# Patient Record
Sex: FEMALE | Race: WHITE | NOT HISPANIC OR LATINO | Employment: FULL TIME | URBAN - METROPOLITAN AREA
[De-identification: names, ages, dates, MRNs, and addresses within clinical notes are randomized per-mention and may not be internally consistent; named-entity substitution may affect disease eponyms.]

---

## 2018-01-11 NOTE — PROGRESS NOTES
MAR 1 2016         RE: Solitario Poe                                To: Jillian BEAU Delgadillo    MR#: [id number]   : 1985   ENC: [id number]                             Fax: [phone]   (Exam #: [id number])      The LMP of this 31year old,  2, para 1 patient was AUG 14 2015,   giving her an ULISSES of MAY 20 2016 and a current gestational age of 35 weeks   4 days by dates  A sonographic examination was performed on MAR 1 2016   using real time equipment  The ultrasound examination was performed using   abdominal & vaginal techniques  The patient has a BMI of 30 0  Her blood   pressure today was 103/67  Earliest ultrasound found in her record: 10/15/15   8w1d  16 ULISSES       Thank you very much for referring this very nice patient for a    consultation and fetal anatomic survey  This is the patient's second   pregnancy and it has been complicated I a placenta previa with concerns   for possible vasa previa  The patient has a history of a prior full-term    section in  without complications  She has a history of   asthma and genital herpes  She denies current use of tobacco, alcohol, or   drugs  Her medications include prenatal vitamins and she has an allergy   to Advair  Her family medical history is otherwise unremarkable  A   review of systems is otherwise negative  On exam, the patient appears   well, in no acute distress, and her abdomen is nontender              Cardiac motion was observed at 144 bpm       INDICATIONS      fetal anatomical survey   low lying placenta   vasa previa   declines genetic screening   long  interval pregnancy   obesity      Exam Types      LEVEL II   Transvaginal      RESULTS      Fetus # 1 of 1   Breech presentation   Placenta Location = Anterior, low lying   Marginal placenta previa   Placenta Grade = II      MEASUREMENTS (* Included In Average GA)      AC              24 4 cm        28 weeks 4 days* (45%)   BPD              6 7 cm        27 weeks 0 days* (12%)   HC              26 5 cm        28 weeks 4 days* (36%)   Femur            5 7 cm        29 weeks 5 days* (61%)      Humerus          5 2 cm        30 weeks 4 days  (78%)   Radius           4 7 cm        34 weeks 0 days   Ulna             4 8 cm        30 weeks 2 days   Tibia            4 7 cm        28 weeks 5 days  (43%)   Fibula           5 0 cm        28 weeks 6 days   Foot             5 1 cm        26 weeks 6 days      Cerebellum       3 3 cm        29 weeks 5 days   Biorbit          4 7 cm        29 weeks 4 days   CisternaMagna    3 9 mm      HC/AC           1 09   FL/AC           0 23   FL/BPD          0 84   EFW (Ac/Fl/Hc)  1323 grams - 2 lbs 15 oz                 (53%)      THE AVERAGE GESTATIONAL AGE is 28 weeks 4 days +/- 18 days  AMNIOTIC FLUID      Q1: 3 5      Q2: 1 4      Q3: 3 6      Q4: 2 5   CELINE Total = 11 0 cm      CERVICAL EVALUATION           Supine               Cervical Length: 3 90 cm        Other Test Results         Resp To T F  Pressure: No                    Funneling?: No              Dynamic Changes?: No      ANATOMY DETAILS      Visualized Appearing Sonographically Normal:   HEAD: (Calvarium, BPD Level, Lateral Ventricles, Choroid Plexus,   Cerebellum, Cisterna Magna);    FACE/NECK: (Neck, Nuchal Fold, Profile,   Orbits, Nose/Lips, Palate, Face);    TH  CAV : (Diaphragm); HEART:   (Four Chamber View, Proximal Left Outflow, Proximal Right Outflow, Aortic   Arch, Ductal Arch, Short Axis of Greater Vessels, Cardiac Axis,   Interventricular Septum, Interatrial Septum, Cardiac Position);    ABD    CAV , STOMACH, RIGHT KIDNEY, LEFT KIDNEY, BLADDER, ABD   WALL, SPINE:   (Cervical Spine, Thoracic Spine, Lumbar Spine, Sacrum);    EXTREMS: (Lt   Humerus, Rt Humerus, Lt Forearm, Rt Forearm, Lt Hand, Rt Hand, Lt Femur,   Rt Femur, Lt Low Leg, Rt Low Leg, Lt Foot, Rt Foot);    GENITALIA   (Female), PLACENTA, UTERUS Suboptimally Visualized:   UMBL  CORD      ADNEXA      The left ovary appeared normal and measured 2 6 x 2 2 x 1 5 cm with a   volume of 4 5 cc  The right ovary appeared normal and measured 4 0 x 3 6 x   1 0 cm with a volume of 7 5 cc  IMPRESSION      Monte IUP   28 weeks and 4 days by this ultrasound  (ULISSES=MAY 20 2016)   Breech presentation   Regular fetal heart rate of 144 bpm   Anterior, low lying placenta   Marginal placenta previa      GENERAL COMMENT      The patient has declined genetic screening, and we discussed that a normal   ultrasound does not exclude all congenital birth defects or karyotypic   abnormalities  The fetal anatomic survey is complete  There is no sonographic evidence   of fetal abnormalities at this time  Good fetal movement and tone are   seen  The amniotic fluid volume appears normal   The placenta is anterior   and is consistent with a marginal placenta previa  The inferior edge of   the anterior placenta is immediately adjacent to the superior aspect of   the internal os consistent with a marginal placenta previa  Utilizing   color Doppler, there is no evidence of a vasa previa  The umbilical cord   does appear to have a velamentous cord insertion which arises from the   superior aspect of the placenta  A transvaginal ultrasound was performed   to assess the cervix, which was not seen well transabdominally  The   cervical length was 3 9 centimeters, which is normal for the current   gestational age  There was no significant funneling or dynamic changes   appreciated  The patient was informed of today's findings and all of her   questions were answered  The limitations of ultrasound were reviewed with   the patient, which she accepts  I discussed the findings of a marginal placenta previa in detail with the   patient and her   I recommend close followup at the    Center to determine if this persists or if the previa resolves    If the placenta is more than 1 cm from the internal os, attempt a vaginal   delivery can occur however if it is less than 1 cm,  section is   recommended  The velamentous cord insertion is and placenta previa his   are both associated with fetal growth restriction and therefore close   followup of fetal growth is recommended at the  Center  I   advised the avoidance of intercourse and or cervical checks given the   location of the placenta at this time and recommend she immediately report   any bleeding  Please note, in addition to the time spent discussing the results of the   ultrasound, I spent approximately 15 minutes of face-to-face time with the   patient, greater than 50% of which was spent in counseling and the   coordination of care for this patient  Thank you very much for allowing us to participate in the care of this   very nice patient  Should you have any questions, please do not hesitate   to contact our office  RECOMMENDATION      Growth Ultrasound: 4 Weeks      YANET Monroe M D     Electronically signed 16 09:36

## 2018-01-12 NOTE — PROGRESS NOTES
2016         RE: Romie Santos                                To: [id number] [address] [place] [address] [address]   [facility]   MR#: [id number]                                   [facility]   : Nickolas   ENC: [id number]                             [facility] [place], [address] [facility]   (Exam #: [id number])                           Fax: [phone]      The LMP of this 31year old,  G2, P1-0-0-1 patient was AUG 12 18, giving   her an ULISSES of MAY 20 2016 and a current gestational age of 42 weeks 3 days   by dates  A sonographic examination was performed on 2016 using   real time equipment  The ultrasound examination was performed using   abdominal & vaginal techniques  The patient has a BMI of 33 1  Her blood   pressure today was 110/68  Earliest ultrasound found in her record: 10/15/15   8w1d  16 ULISSES      Cardiac motion was observed at 153 bpm       INDICATIONS      low lying placenta   long  interval pregnancy   obesity      Exam Types      Level I   Transvaginal      RESULTS      Fetus # 1 of 1   Vertex presentation   Fetal growth appeared normal   Placenta Location = Anterior, low lying   No placenta previa      MEASUREMENTS (* Included In Average GA)      AC              32 5 cm        36 weeks 4 days* (55%)   BPD              8 1 cm        32 weeks 4 days* (<5%)   HC              31 8 cm        35 weeks 2 days* (23%)   Femur            7 3 cm        36 weeks 5 days* (65%)      HC/AC           0 98   FL/AC           0 22   FL/BPD          0 90   EFW (Ac/Fl/Hc)  2941 grams - 6 lbs 8 oz                 (49%)      THE AVERAGE GESTATIONAL AGE is 35 weeks 2 days +/- 21 days        AMNIOTIC FLUID      Q1: 2 3      Q2: 1 6      Q3: 3 2      Q4: 2 2   CELINE Total = 9 3 cm   Amniotic Fluid: Normal      ANATOMY DETAILS      Visualized Appearing Sonographically Normal:   HEAD: (Calvarium, BPD Level);    ABD  CAV , STOMACH, RIGHT KIDNEY, LEFT   KIDNEY, BLADDER      Not Visualized:   HEAD: (Cerebellum, Cisterna Magna); HEART      IMPRESSION      Monte IUP   35 weeks and 2 days by this ultrasound  (ULISSES=MAY 28 2016)   Vertex presentation   Fetal growth appeared normal   Regular fetal heart rate of 153 bpm   Anterior, low lying placenta   No placenta previa      GENERAL COMMENT      On exam today the patient appears well, in no acute distress, and denies   any complaints  Her abdomen is non-tender  There has been appropriate interval fetal growth  Good fetal movement and   tone are seen  The amniotic fluid volume appears normal   The placenta is   anterior and it appears sonographically normal measuring 1 4 cm from the   internal os  The anatomic structures listed above could not be optimally   imaged today because of fetal position; however, these structures were   seen on a prior ultrasound and appeared sonographically normal   The   patient was informed of today's findings and all of her questions were   answered  The limitations of ultrasound were reviewed with the patient   labor precautions as well as fetal kick counts were reviewed  A transvaginal ultrasound was performed to establish the exact location of   the placenta with regard to proximity to the internal os  The closest   portion of the inferior edge of the placenta measures 1 4 cm from the   internal os consistent with low-lying placentation  I discussed this with   the patient in detail as the patient desires an attempt had a vaginal   delivery  I discussed with her that most studies show that as long as the   placenta is more than 1 cm from the internal os, there is little risk for   bleeding and patients can certainly attempt a vaginal delivery  Whenever   the placenta is between one and 2 cm from the internal os, there appears   to be a slight increased risk for bleeding and therefore close observation   is recommended in labor    I asked the patient to further discuss her   attempt a vaginal delivery with you but I certainly feel it is reasonable   to attempt a vaginal delivery  Recommend further ultrasounds as clinically indicated  Thank you very much for allowing us to participate in the care of this   very nice patient  Should you have any questions, please do not hesitate   to contact our office  Please note, in addition to the time spent discussing the results of the   ultrasound, I spent approximately 10 minutes of face-to-face time with the   patient, greater than 50% of which was spent in counseling and the   coordination of care for this patient  YANET Wetzel M D     Electronically signed 04/25/16 14:03           Electronically signed by:Zenaida Batres DO  Apr 26 5382  1:14PM EST

## 2018-01-13 NOTE — PROGRESS NOTES
MAR 28 2016         RE: Skye Wong                                To: [id number] [address] [place] [address] [address]   [facility]   MR#: [id number]                                   [facility]   : [facility]   ENC: [id number]:ROLANDO CHA, [facility]   (Exam #: [id number])                           Fax: [phone]      The LMP of this 31year old,  2, para 1 patient was AUG 14 2015,   giving her an ULISSES of MAY 20 2016 and a current gestational age of 26 weeks   3 days by dates  A sonographic examination was performed on MAR 28 2016   using real time equipment  The ultrasound examination was performed using   abdominal technique  The patient has a BMI of 31 6  Her blood pressure   today was 104/55  Earliest ultrasound found in her record: 10/15/15   8w1d  16 ULISSES            Cardiac motion was observed at 139 bpm       INDICATIONS      low lying placenta   declines genetic screening   long  interval pregnancy   obesity      Exam Types      Level I      RESULTS      Fetus # 1 of 1   Vertex presentation   Fetal growth appeared normal   Placenta Location = Anterior, low lying   Placenta Grade = I      AMNIOTIC FLUID      Q1: 2 2      Q2: 2 7      Q3: 5 3      Q4: 2 3   CELINE Total = 12 5 cm   Amniotic Fluid: Normal      MEASUREMENTS (* Included In Average GA)      AC              28 0 cm        32 weeks 1 day * (41%)   BPD              7 6 cm        30 weeks 4 days* (6%)   HC              29 3 cm        32 weeks 0 days* (26%)   Femur            6 6 cm        33 weeks 4 days* (72%)      HC/AC           1 05   FL/AC           0 24   FL/BPD          0 86   EFW (Ac/Fl/Hc)  2012 grams - 4 lbs 7 oz                 (47%)      THE AVERAGE GESTATIONAL AGE is 32 weeks 1 day +/- 18 days  ANATOMY COMMENTS      Fetal anatomy has been previously documented; no anomalies were   identified   No fetal structural abnormality is identified on the Level I   survey today  Follow up anatomy of the lateral ventricles, 4 chamber view,   outflow tracts, diaphragm,  kidneys, stomach and bladder appear normal    Fetal interval growth and amniotic fluid volume are normal  The anterior   placenta has a battledore insertion of the cord at the superior edge of   the placenta  The lower edge of the placenta ranged from 0 6-1 4 cm from   the internal os in separate views likely due to a uterine contraction that   can change the appearance of how low the placenta looks  IMPRESSION      Monte IUP   32 weeks and 1 day by this ultrasound  (ULISSES=MAY 22 2016)   Vertex presentation   Fetal growth appeared normal   Regular fetal heart rate of 139 bpm   Anterior, low lying placenta      GENERAL COMMENT      The patient was informed of the findings and counseled about the   limitations of the exam in detecting all forms of fetal congenital   abnormalities  She denies any vaginal bleeding or uterine cramping/contractions  She does   feel fetal movement  Exam shows she is comfortable and her abdomen is non tender  Follow up recommended:   1  Recommend pelvic rest    2  Recommend one last TVS for placental location and growth in 4 weeks  3  I reviewed the following paper with our patient since she has a   diagnosis of a low lying placenta made today  Her placenta is within   0 6-1 4 cm from the internal os      Placenta previa: distance to internal os and mode of delivery  AUIain P,   Mary S, Cande I, Flores moseley, Nicholas FM, Kima I, Jeff ASO Am J   Obstet Gynecol  2009;201(3):266 e1-5       95 women with a previa were scanned within 28 days of delivery  Cases in   which the placental edge overlapped the internal cervical (n = 42)   underwent  section delivery  Labor was allowed in those with   placental edge to internal os distance of 1-10 mm (group 1, 24 women) and   those with a distance of 11-20 mm (group 2, 29 women)  Rates of    section delivery were compared (75% vs 31%) and of bleeding before labor   (29% vs 3%)and were higher in group 1 than in group 2  Blood loss at   delivery (662 +/- 466 mL vs 510 +/- 547 mL) and rate of severe postpartum   hemorrhage (21% vs 10%) were similar in the 2 groups  In this study more   than two-thirds of women with a placental edge to cervical os distance   of>10 mm delivered vaginally with minimal morbidity  Thus it has been   suggested  that in the absence of any contraindications to a vaginal   delivery, these patients may be offered a trial of labor  Therefore if 36 week US shows the placenta is less then 10 mm to the   internal os recommend CS at 37 weeks without an amnio and would recommend   steroids  If > 10 mm are found then would allow vaginal delivery attempt   but would encourage patient to present early in labor for closer   observation  She can have her CS planned close to 37 week just incase the   placenta does not move next time in 4 weeks  Recommend pelvic rest till   her low lying placenta resolves  YANET Marquez M D     Maternal-Fetal Medicine   Electronically signed 16 14:48

## 2018-01-15 NOTE — MISCELLANEOUS
Chief Complaint  Chief Complaint Free Text Note Form: Transition of care call made today  Pt does not want to make a follow up appt here at this time considering she has a lot going on at this time with the new baby and follow up with her ob doctor  Pt will call at a later time to do her well check up  Active Problems    1  Allergic Reaction (995 3)   2  Enlarged thyroid (240 9) (E04 9)   3  HSV infection (054 9) (B00 9)   4  Low-lying placenta in third trimester (641 03) (O44 03)   5  SROM (spontaneous rupture of membranes)   6  Supervision of normal pregnancy (V22 1) (Z34 90)   7  Uterine contractions    Past Medical History    1  History of Candidiasis, cutaneous (112 3) (B37 2)   2  History of  (spontaneous vaginal delivery) (650) (O80)    Surgical History    1  Denied: History Of Prior Surgery    Family History  Mother    1  Family history of Diabetes Mellitus (V18 0)   2  Family history of Hypertension (V17 49)  Father    3  Family history of Hypertension (V17 49)  Paternal Grandmother    3  Family history of breast cancer (V16 3) (Z80 3)    Social History    · Denied: History of Alcohol use   · Denied: History of Drug use   · Exercises regularly   · Never A Smoker    Current Meds   1  Breast Pump Miscellaneous; double electric; Therapy: [id number] to (Last Rx:2016) Ordered   2  Prenatal Vitamins TABS; TAKE TABLET  per pt 1 tab daily; Therapy: (Recorded:2016) to Recorded   3  Valtrex 500 MG Oral Tablet; 1 tab q 12hours until the end of pregnancy; Therapy: 73Eqk0011 to (Last Rx:2016)  Requested for: 2016 Ordered    Allergies    1  Advair Diskus MISC    2  No Known Environmental Allergies   3  Nuts   4  Seafood    Message   Recorded as Task   Date: 2016 12:02 PM, Created By: System   Task Name: Jax Staff   Assigned To: [facility] triage,Team   Regarding Patient: Steph Klein, Status:  In Progress   Comment:    System - 22 May 2016 12:02 PM     Patient discharged from hospital   Patient Name: Kyung Roa  Patient YOB: 1985  Discharge Date: 5/22/2016  [facility]ility[facility] - 23 May 2016 8:13 AM     [facility] - 23 May 2016 8:14 AM     TASK REASSIGNED: Previously Assigned To Byron Saul - 23 May 2016 7:11 PM     TASK IN PROGRESS     Future Appointments    Date/Time Provider Specialty Site   93/18/2038 06:68 AM Erin Varghese, DO Obstetrics/Gynecology [address]     Signatures   Electronically signed by : Edwardo Mullins, ; Jun 24 2016  9:41AM EST                       (Author)    Electronically signed by : BEAU Gaona ; Jun 24 2016  9:48AM EST                       (Author)

## 2018-05-30 ENCOUNTER — TELEPHONE (OUTPATIENT)
Dept: FAMILY MEDICINE CLINIC | Facility: CLINIC | Age: 33
End: 2018-05-30

## 2018-05-31 DIAGNOSIS — T78.2XXD ANAPHYLAXIS, SUBSEQUENT ENCOUNTER: Primary | ICD-10-CM

## 2018-05-31 RX ORDER — EPINEPHRINE 0.3 MG/.3ML
INJECTION SUBCUTANEOUS
COMMUNITY
Start: 2011-01-26 | End: 2018-05-31 | Stop reason: SDUPTHER

## 2018-05-31 RX ORDER — EPINEPHRINE 0.3 MG/.3ML
0.3 INJECTION SUBCUTANEOUS ONCE
Qty: 2 EACH | Refills: 0 | Status: SHIPPED | OUTPATIENT
Start: 2018-05-31 | End: 2018-07-20 | Stop reason: SDUPTHER

## 2018-07-10 ENCOUNTER — TELEPHONE (OUTPATIENT)
Dept: FAMILY MEDICINE CLINIC | Facility: CLINIC | Age: 33
End: 2018-07-10

## 2018-07-11 NOTE — TELEPHONE ENCOUNTER
Patient left a voicemail that she did not  the script for her Epi Pen in May as the medication needed a prior auth and was not complete  Patient stated that the pharmacy will be faxing over the request for the auth

## 2018-07-11 NOTE — TELEPHONE ENCOUNTER
Pt left a vm to request refill of epipen  I did try to darion ouut to the pt as she has not been seen here since 2015 should need a fu appt  I also saw a refill of epi pen in meds from 5/2018 and I wanted to clarify what happened if she used the dose from may and to set up a visit  Please review and advise   Thank you

## 2018-07-20 ENCOUNTER — OFFICE VISIT (OUTPATIENT)
Dept: FAMILY MEDICINE CLINIC | Facility: CLINIC | Age: 33
End: 2018-07-20
Payer: COMMERCIAL

## 2018-07-20 VITALS
TEMPERATURE: 97 F | DIASTOLIC BLOOD PRESSURE: 60 MMHG | BODY MASS INDEX: 31.16 KG/M2 | SYSTOLIC BLOOD PRESSURE: 100 MMHG | HEART RATE: 78 BPM | RESPIRATION RATE: 18 BRPM | HEIGHT: 65 IN | WEIGHT: 187 LBS

## 2018-07-20 DIAGNOSIS — Z00.00 ENCOUNTER FOR HEALTH MAINTENANCE EXAMINATION: Primary | ICD-10-CM

## 2018-07-20 DIAGNOSIS — T78.2XXD ANAPHYLAXIS, SUBSEQUENT ENCOUNTER: ICD-10-CM

## 2018-07-20 PROBLEM — T78.2XXA ANAPHYLACTIC SYNDROME: Status: ACTIVE | Noted: 2018-07-20

## 2018-07-20 PROCEDURE — 99395 PREV VISIT EST AGE 18-39: CPT | Performed by: FAMILY MEDICINE

## 2018-07-20 RX ORDER — EPINEPHRINE 0.3 MG/.3ML
0.3 INJECTION SUBCUTANEOUS ONCE
Qty: 2 EACH | Refills: 1 | Status: SHIPPED | OUTPATIENT
Start: 2018-07-20 | End: 2020-03-13 | Stop reason: SDUPTHER

## 2018-07-20 NOTE — PROGRESS NOTES
Ileana Caraballo 1985 female MRN: [id number]    Health Maintenance Visit      SUBJECTIVE    HPI:  Ileana Caraballo is a 34 y o  female who presented for a routine health maintenance visit  Has not been seen since the 2016  Has been busy taking care of her now 2 years daughter  Overall, pt is doing well and offers no acute complaints  Pt also requesting a refill for Epi-Pen for which uses in case she ingest nut products or shell fish  Has not has an allergic reaction in years  Typically manifest with throat swelling, hives, and chest tightness  Pap test questions: periods are regular  does not have menstrual periods  LMP 7/17/18  no unusual pelvic pain  does have pelvic pain  no unusual vaginal discharge  no previous abnormal Pap tests  no family or personal history of cervical cancer  Normal pap smear in 2016 was normal    Health Maintenance   Topic Date Due    PNEUMOCOCCAL POLYSACCHARIDE VACCINE AGE 2-64 HIGH RISK  06/25/1989    INFLUENZA VACCINE  09/01/2018    HIV SCREENING  10/31/2018    Depression Screening PHQ-9  07/20/2019    DTaP,Tdap,and Td Vaccines (2 - Tdap) 02/25/2026       CRC screening: No personal or family history of colon cancer or colon polyps  BrCa screening: There is no personal or family history of breast cancer  She denies finding new breast lumps, breast pain or nipple discharge  CVS screening: Patient denies any exertional chest pain, dyspnea, palpitations, syncope, orthopnea, edema or paroxysmal nocturnal dyspnea  DM screening: + FM of DM but no personal complaints of polyuria, polydipsia, blurry vision, chest pain, dyspnea or claudication  No foot burning, numbness or pain  No personal or family history of skin cancers or melanoma      Review of Systems    Historical Information   Past Medical History:   Diagnosis Date    Asthma     Disease of thyroid gland     enlarged thyroid    Varicella     Visual impairment     contacts in     [place]  Past Surgical History:   Procedure Laterality Date    TONSILECTOMY AND ADNOIDECTOMY      WISDOM TOOTH EXTRACTION       Family History   Problem Relation Age of Onset    Diabetes Mother     Hypertension Mother     Pulmonary embolism Mother     Deep vein thrombosis Mother     Hypertension Father     Cancer Maternal Grandmother     Breast cancer Maternal Grandmother     Cancer Maternal Grandfather     Prostate cancer Maternal Grandfather     Cancer Paternal Grandfather     Colon cancer Paternal Grandfather      Social History   History   Alcohol Use No     History   Drug Use No     History   Smoking Status    Never Smoker   Smokeless Tobacco    Never Used       Medications:    Current Outpatient Prescriptions   Medication Sig Dispense Refill    acetaminophen (TYLENOL) 325 mg tablet 1-2 over the counter tylenol every 4 hours as needed 30 tablet 0    EPINEPHrine (EPIPEN 2-FINA) 0 3 mg/0 3 mL SOAJ Inject 0 3 mL (0 3 mg total) into a muscle once for 1 dose 2 each 1    Multiple Vitamin (MULTIVITAMIN) tablet Take 1 tablet by mouth daily  No current facility-administered medications for this visit  Current Outpatient Prescriptions:     acetaminophen (TYLENOL) 325 mg tablet, 1-2 over the counter tylenol every 4 hours as needed, Disp: 30 tablet, Rfl: 0    EPINEPHrine (EPIPEN 2-FINA) 0 3 mg/0 3 mL SOAJ, Inject 0 3 mL (0 3 mg total) into a muscle once for 1 dose, Disp: 2 each, Rfl: 1    Multiple Vitamin (MULTIVITAMIN) tablet, Take 1 tablet by mouth daily  , Disp: , Rfl:     Allergies   Allergen Reactions    Nuts Anaphylaxis    Advair Diskus [Fluticasone-Salmeterol] Hives    Shellfish-Derived Products        OBJECTIVE  Vitals:   Vitals:    07/20/18 1033   BP: 100/60   Pulse: 78   Resp: 18   Temp: (!) 97 °F (36 1 °C)   Weight: 84 8 kg (187 lb)   Height: 5' 5" (1 651 m)     Wt Readings from Last 3 Encounters:   07/20/18 84 8 kg (187 lb)   07/13/16 86 2 kg (190 lb)   05/20/16 94 3 kg (208 lb)     Body mass index is 31 12 kg/m²  BP Readings from Last 3 Encounters:   07/20/18 100/60   07/13/16 112/64   05/22/16 99/62     Pulse Readings from Last 3 Encounters:   07/20/18 78   07/13/16 66   05/22/16 81       Patient's last menstrual period was 07/10/2018  Physical Exam     Lab:  I have personally reviewed all pertinent results  No visits with results within 6 Month(s) from this visit  Latest known visit with results is:   Admission on 05/20/2016, Discharged on 05/22/2016   Component Date Value Ref Range Status    ABO Grouping 05/20/2016 A   Final    Rh Factor 05/20/2016 Positive   Final    Antibody Screen 05/20/2016 Negative   Final    WBC 05/20/2016 12 89* 4 31 - 10 16 Thousand/uL Final    RBC 05/20/2016 4 25  3 81 - 5 12 Million/uL Final    Hemoglobin 05/20/2016 11 7  11 5 - 15 4 g/dL Final    Hematocrit 05/20/2016 35 7  34 8 - 46 1 % Final    MCV 05/20/2016 84  82 - 98 fL Final    MCH 05/20/2016 27 5  26 8 - 34 3 pg Final    MCHC 05/20/2016 32 8  31 4 - 37 4 g/dL Final    RDW 05/20/2016 13 6  11 6 - 15 1 % Final    MPV 05/20/2016 9 9  8 9 - 12 7 fL Final    Platelets 53/89/9193 256  149 - 390 Thousands/uL Final    nRBC 05/20/2016 0  /100 WBCs Final    Neutrophils Relative 05/20/2016 77* 43 - 75 % Final    Lymphocytes Relative 05/20/2016 15  14 - 44 % Final    Monocytes Relative 05/20/2016 7  4 - 12 % Final    Eosinophils Relative 05/20/2016 1  0 - 6 % Final    Basophils Relative 05/20/2016 0  0 - 1 % Final    Neutrophils Absolute 05/20/2016 9 82* 1 85 - 7 62 Thousands/µL Final    Lymphocytes Absolute 05/20/2016 1 92  0 60 - 4 47 Thousands/µL Final    Monocytes Absolute 05/20/2016 0 88  0 17 - 1 22 Thousand/µL Final    Eosinophils Absolute 05/20/2016 0 14  0 00 - 0 61 Thousand/µL Final    Basophils Absolute 05/20/2016 0 02  0 00 - 0 10 Thousands/µL Final    RPR 05/20/2016 Non-Reactive  Non-Reactive Final    pH, Cord Art 05/20/2016 7  358  7 230 - 7 430 Final    pCO2, Cord Art 05/20/2016 42 1  30 0 - 60 0 Final    pO2, Cord Art 05/20/2016 31 2* 5 0 - 25 0 mm HG Final    HCO3, Cord Art 05/20/2016 23 1  17 3 - 27 3 mmol/L Final    Base Exc, Cord Art 05/20/2016 -2 3* 3 0 - 11 0 mmol/L Final    O2 Content, Cord Art 05/20/2016 17 5  ml/dl Final    O2 Hgb, Arterial Cord 05/20/2016 73 4  % Final    pH, Cord Germán 05/20/2016 7 321  7 190 - 7 490 Final    pCO2, Cord Germán 05/20/2016 48 6* 27 0 - 43 0 mm HG Final    pO2, Cord Germán 05/20/2016 16 0  15 0 - 45 0 mm HG Final    HCO3, Cord Germán 05/20/2016 24 5  12 2 - 28 6 mmol/L Final    Base Exc, Cord Germán 05/20/2016 -2 1* 1 0 - 9 0 mmol/L Final    O2 Cont, Cord Germán 05/20/2016 7 2  mL/dL Final    O2 HGB,VENOUS CORD 05/20/2016 30 9  % Final    PLACENTA IN STORAGE 05/20/2016 Placenta Discarded   Final         Assessment/ Plan  Problem List Items Addressed This Visit     BMI 31 0-31 9,adult     Suggest 150 min of moderate intensity exercise per week and encourage more diversity to her diet  Reduce simple carbs and starchy vegetables  Given her FH of diabetes and weight, will check lipid panel, LFT, and FBG  Call with results  Relevant Orders    Lipid Panel with Direct LDL reflex    Comprehensive metabolic panel    Encounter for health maintenance examination - Primary     Doing well  No acute concerns  Score 0 on PHQ2  Follow up in 1 year for HM and Pap smear  Anaphylactic syndrome    Relevant Medications    EPINEPHrine (EPIPEN 2-FINA) 0 3 mg/0 3 mL SOAJ              New Medications Ordered This Visit   Medications    EPINEPHrine (EPIPEN 2-FINA) 0 3 mg/0 3 mL SOAJ     Sig: Inject 0 3 mL (0 3 mg total) into a muscle once for 1 dose     Dispense:  2 each     Refill:  1       In addition to the above, the patient was counseled on general preventative health care subjects, including but not limited to:  - Nutrition, healthy weight, aerobic and weight-bearing exercise  - Mental health, social support, and self care    - Patient made aware of  services at the [facility] counseling on the many choices of family planning methods including OCP (estrogen/progesterone) is provided, and all questions answered  Compliance is strongly emphasized  Last PAP smear in 2016 was normal  Will need another with HPV co-testing in 2019     Most Recent Immunizations   Administered Date(s) Administered    DTaP 02/25/2016    Tdap 02/25/2016       Immunization status: up to date and documented  Return to [facility] in 1 year for annual  visit     PCP: Issa Billy MD    _____________________________________________________________________     Namrata Rojas MD, PGY-2  [facility] Medicine   7/20/2018

## 2018-08-01 ENCOUNTER — OFFICE VISIT (OUTPATIENT)
Dept: FAMILY MEDICINE CLINIC | Facility: CLINIC | Age: 33
End: 2018-08-01
Payer: COMMERCIAL

## 2018-08-01 VITALS
DIASTOLIC BLOOD PRESSURE: 70 MMHG | HEIGHT: 66 IN | HEART RATE: 72 BPM | WEIGHT: 184.4 LBS | RESPIRATION RATE: 16 BRPM | SYSTOLIC BLOOD PRESSURE: 110 MMHG | BODY MASS INDEX: 29.63 KG/M2 | TEMPERATURE: 98.2 F

## 2018-08-01 DIAGNOSIS — M54.50 ACUTE BILATERAL LOW BACK PAIN WITHOUT SCIATICA: Primary | ICD-10-CM

## 2018-08-01 PROCEDURE — 1036F TOBACCO NON-USER: CPT | Performed by: FAMILY MEDICINE

## 2018-08-01 PROCEDURE — 3008F BODY MASS INDEX DOCD: CPT | Performed by: FAMILY MEDICINE

## 2018-08-01 PROCEDURE — 99213 OFFICE O/P EST LOW 20 MIN: CPT | Performed by: FAMILY MEDICINE

## 2018-08-01 NOTE — PROGRESS NOTES
Assessment/Plan:    BMI 31 0-31 9,adult  Counseled patient on adhering to a low-fat, heart healthy diet and incorporating exercise into part of her daily routine for weight loss    Acute bilateral low back pain without sciatica  Patient counseled on avoiding lifting heavy objects, caution with bending and twisting, avoiding injury to  her lower back  Apply heating pad to the affected area  Start Advil 400 milligrams p o  q 8h p r n  Educated on side effects of medication  Patient advised if symptoms persist or worsen to RTC  Will consider an alternate NSAID of stronger intensity as well as referral to physical therapy at that time          Subjective:      Patient ID: Ankit Pressley is a 34 y o  female  HPI    This is a 28-year-old female with no significant past medical history who presents with a 5 day history of low back and left hip pain  She describes lower back pain as bilateral with worse pain in the sacral area  Pain ranges from 1-5 in intensity at maximum, is described as sharp, intermittent, occasionally radiates to the superior aspect of her lateral left thigh and hip, relieved spontaneously and no known aggravating factors  She has not used any medication at this time to treat her pain  The patient also explains that when she was pregnant with her daughter almost 2 years ago she did have a similar type hip and back pain which had resolved spontaneously  Katrina Chatman denies any recent trauma or injury to her lower back, heavy lifting or sudden twisting  She has been able to continue with her usual routine daily  No numbness, tingling or weakness  She also denies any urinary/stool incontinence or saddle anesthesia  Review of Systems   Constitutional: Negative for activity change, appetite change, chills and fatigue  HENT: Negative for congestion and trouble swallowing  Respiratory: Negative for cough and shortness of breath  Cardiovascular: Negative for chest pain and leg swelling  Gastrointestinal: Negative for abdominal distention, abdominal pain, constipation, diarrhea and vomiting  Genitourinary: Negative for decreased urine volume, difficulty urinating, dysuria and flank pain  Musculoskeletal: Positive for arthralgias (Left hip pain) and back pain  Negative for gait problem, joint swelling, myalgias, neck pain and neck stiffness  Skin: Negative for color change and pallor  Neurological: Negative for dizziness, tremors, seizures, weakness, light-headedness, numbness and headaches  Psychiatric/Behavioral: Negative for behavioral problems  Objective:      /70 (BP Location: Left arm, Patient Position: Sitting, Cuff Size: Large)   Pulse 72   Temp 98 2 °F (36 8 °C) (Tympanic)   Resp 16   Ht 5' 6" (1 676 m)   Wt 83 6 kg (184 lb 6 4 oz)   LMP 07/10/2018   BMI 29 76 kg/m²          Physical Exam   Constitutional: She is oriented to person, place, and time  She appears well-developed and well-nourished  No distress  HENT:   Head: Normocephalic and atraumatic  Mouth/Throat: Oropharynx is clear and moist  No oropharyngeal exudate  Eyes: Conjunctivae are normal  Right eye exhibits no discharge  Left eye exhibits no discharge  Neck: Normal range of motion  Neck supple  No JVD present  No tracheal deviation present  No thyromegaly present  Cardiovascular: Normal rate, regular rhythm and intact distal pulses  Exam reveals no friction rub  No murmur heard  Pulmonary/Chest: Effort normal and breath sounds normal  No stridor  No respiratory distress  She has no wheezes  She has no rales  Abdominal: Soft  Bowel sounds are normal  She exhibits no distension and no mass  There is no tenderness  Musculoskeletal: Normal range of motion  She exhibits no edema, tenderness or deformity  Full range of movement at back, and hip  No vertebral tenderness on palpation, no paraspinal muscle tenderness    No erythema, warmth or swelling over the lower back area  No tenderness on palpation over hip areas or anterior thighs  Strength 5/5 in lower limbs, tone normal  Straight leg test negative   Neurological: She is alert and oriented to person, place, and time  She has normal reflexes  She displays normal reflexes  No cranial nerve deficit  She exhibits normal muscle tone  Coordination normal    Skin: Skin is warm and dry  No rash noted  She is not diaphoretic  No erythema

## 2018-08-01 NOTE — ASSESSMENT & PLAN NOTE
Patient counseled on avoiding lifting heavy objects, caution with bending and twisting, avoiding injury to  her lower back  Apply heating pad to the affected area  Start Advil 400 milligrams p o  q 8h p r n  Educated on side effects of medication  Patient advised if symptoms persist or worsen to RTC    Will consider an alternate NSAID of stronger intensity as well as referral to physical therapy at that time

## 2018-08-01 NOTE — ASSESSMENT & PLAN NOTE
Counseled patient on adhering to a low-fat, heart healthy diet and incorporating exercise into part of her daily routine for weight loss

## 2019-04-18 ENCOUNTER — LAB (OUTPATIENT)
Dept: LAB | Facility: AMBULARY SURGERY CENTER | Age: 34
End: 2019-04-18
Payer: COMMERCIAL

## 2019-04-18 ENCOUNTER — ANNUAL EXAM (OUTPATIENT)
Dept: OBGYN CLINIC | Facility: CLINIC | Age: 34
End: 2019-04-18
Payer: COMMERCIAL

## 2019-04-18 VITALS — WEIGHT: 198.2 LBS | DIASTOLIC BLOOD PRESSURE: 64 MMHG | BODY MASS INDEX: 31.99 KG/M2 | SYSTOLIC BLOOD PRESSURE: 98 MMHG

## 2019-04-18 DIAGNOSIS — E04.9 ENLARGED THYROID: ICD-10-CM

## 2019-04-18 DIAGNOSIS — Z01.419 WELL WOMAN EXAM WITH ROUTINE GYNECOLOGICAL EXAM: Primary | ICD-10-CM

## 2019-04-18 LAB
ALBUMIN SERPL BCP-MCNC: 3.9 G/DL (ref 3.5–5)
ALP SERPL-CCNC: 80 U/L (ref 46–116)
ALT SERPL W P-5'-P-CCNC: 19 U/L (ref 12–78)
ANION GAP SERPL CALCULATED.3IONS-SCNC: 4 MMOL/L (ref 4–13)
AST SERPL W P-5'-P-CCNC: 12 U/L (ref 5–45)
BILIRUB SERPL-MCNC: 0.33 MG/DL (ref 0.2–1)
BUN SERPL-MCNC: 14 MG/DL (ref 5–25)
CALCIUM SERPL-MCNC: 8.4 MG/DL (ref 8.3–10.1)
CHLORIDE SERPL-SCNC: 107 MMOL/L (ref 100–108)
CO2 SERPL-SCNC: 27 MMOL/L (ref 21–32)
CREAT SERPL-MCNC: 0.77 MG/DL (ref 0.6–1.3)
GFR SERPL CREATININE-BSD FRML MDRD: 103 ML/MIN/1.73SQ M
GLUCOSE SERPL-MCNC: 87 MG/DL (ref 65–140)
POTASSIUM SERPL-SCNC: 3.8 MMOL/L (ref 3.5–5.3)
PROT SERPL-MCNC: 7.8 G/DL (ref 6.4–8.2)
SODIUM SERPL-SCNC: 138 MMOL/L (ref 136–145)
TSH SERPL DL<=0.05 MIU/L-ACNC: 1.07 UIU/ML (ref 0.36–3.74)

## 2019-04-18 PROCEDURE — G0145 SCR C/V CYTO,THINLAYER,RESCR: HCPCS | Performed by: PATHOLOGY

## 2019-04-18 PROCEDURE — 99395 PREV VISIT EST AGE 18-39: CPT | Performed by: OBSTETRICS & GYNECOLOGY

## 2019-04-18 PROCEDURE — 80053 COMPREHEN METABOLIC PANEL: CPT

## 2019-04-18 PROCEDURE — 84443 ASSAY THYROID STIM HORMONE: CPT

## 2019-04-18 PROCEDURE — G0124 SCREEN C/V THIN LAYER BY MD: HCPCS | Performed by: PATHOLOGY

## 2019-04-18 PROCEDURE — 87624 HPV HI-RISK TYP POOLED RSLT: CPT | Performed by: OBSTETRICS & GYNECOLOGY

## 2019-04-18 PROCEDURE — 36415 COLL VENOUS BLD VENIPUNCTURE: CPT

## 2019-04-22 LAB
HPV HR 12 DNA CVX QL NAA+PROBE: NEGATIVE
HPV16 DNA CVX QL NAA+PROBE: NEGATIVE
HPV18 DNA CVX QL NAA+PROBE: POSITIVE

## 2019-04-26 LAB
LAB AP GYN PRIMARY INTERPRETATION: ABNORMAL
Lab: ABNORMAL
PATH INTERP SPEC-IMP: ABNORMAL

## 2019-05-01 ENCOUNTER — PROCEDURE VISIT (OUTPATIENT)
Dept: OBGYN CLINIC | Facility: CLINIC | Age: 34
End: 2019-05-01
Payer: COMMERCIAL

## 2019-05-01 VITALS — WEIGHT: 197 LBS | DIASTOLIC BLOOD PRESSURE: 76 MMHG | BODY MASS INDEX: 31.8 KG/M2 | SYSTOLIC BLOOD PRESSURE: 118 MMHG

## 2019-05-01 DIAGNOSIS — R87.610 ASCUS WITH POSITIVE HIGH RISK HPV CERVICAL: Primary | ICD-10-CM

## 2019-05-01 DIAGNOSIS — R87.810 ASCUS WITH POSITIVE HIGH RISK HPV CERVICAL: Primary | ICD-10-CM

## 2019-05-01 LAB — SL AMB POCT URINE HCG: NEGATIVE

## 2019-05-01 PROCEDURE — 57454 BX/CURETT OF CERVIX W/SCOPE: CPT | Performed by: OBSTETRICS & GYNECOLOGY

## 2019-05-01 PROCEDURE — 81025 URINE PREGNANCY TEST: CPT | Performed by: OBSTETRICS & GYNECOLOGY

## 2019-05-01 PROCEDURE — 88305 TISSUE EXAM BY PATHOLOGIST: CPT | Performed by: PATHOLOGY

## 2019-05-01 RX ORDER — IBUPROFEN 200 MG
TABLET ORAL EVERY 6 HOURS PRN
COMMUNITY

## 2020-03-13 DIAGNOSIS — T78.2XXD ANAPHYLAXIS, SUBSEQUENT ENCOUNTER: ICD-10-CM

## 2020-03-13 RX ORDER — EPINEPHRINE 0.3 MG/.3ML
0.3 INJECTION SUBCUTANEOUS ONCE
Qty: 2 EACH | Refills: 1 | Status: SHIPPED | OUTPATIENT
Start: 2020-03-13 | End: 2021-10-20 | Stop reason: SDUPTHER

## 2020-07-23 ENCOUNTER — ANNUAL EXAM (OUTPATIENT)
Dept: OBGYN CLINIC | Facility: CLINIC | Age: 35
End: 2020-07-23
Payer: COMMERCIAL

## 2020-07-23 VITALS
TEMPERATURE: 98.3 F | BODY MASS INDEX: 32.95 KG/M2 | WEIGHT: 205 LBS | DIASTOLIC BLOOD PRESSURE: 60 MMHG | HEIGHT: 66 IN | SYSTOLIC BLOOD PRESSURE: 104 MMHG

## 2020-07-23 DIAGNOSIS — R87.810 ASCUS WITH POSITIVE HIGH RISK HPV CERVICAL: ICD-10-CM

## 2020-07-23 DIAGNOSIS — Z01.419 WELL WOMAN EXAM WITH ROUTINE GYNECOLOGICAL EXAM: Primary | ICD-10-CM

## 2020-07-23 DIAGNOSIS — R87.610 ASCUS WITH POSITIVE HIGH RISK HPV CERVICAL: ICD-10-CM

## 2020-07-23 PROCEDURE — G0145 SCR C/V CYTO,THINLAYER,RESCR: HCPCS | Performed by: PATHOLOGY

## 2020-07-23 PROCEDURE — S0612 ANNUAL GYNECOLOGICAL EXAMINA: HCPCS | Performed by: OBSTETRICS & GYNECOLOGY

## 2020-07-23 PROCEDURE — 87624 HPV HI-RISK TYP POOLED RSLT: CPT | Performed by: OBSTETRICS & GYNECOLOGY

## 2020-07-23 PROCEDURE — G0124 SCREEN C/V THIN LAYER BY MD: HCPCS | Performed by: PATHOLOGY

## 2020-07-25 NOTE — PROGRESS NOTES
ASSESSMENT & PLAN: Sydnie Scott is a 37 y o  [id number] with normal gynecologic exam     1   Routine well woman exam done today  2    Pap and HPV:Pap with HPV was done today  Current ASCCP Guidelines reviewed  Last Pap 2019:  ASCUS with POSITIVE high risk HPV  - colpo 2019 - no dysplasia  3  The patient declined STD testing  4  The patient is sexually active  Contraception - male vasectomy of    11  The following were reviewed in today's visit: breast self exam, adequate intake of calcium and vitamin D, exercise, healthy diet and hx of abnormal pap smear  6  Patient to return to office in 12 months for annual gyn exam      All questions have been answered to her satisfaction  CC:  Annual Gynecologic Examination    HPI: Sydnie Scott is a 37 y o  [id number] who presents for annual gynecologic examination  She has the following concerns:  None  Needs repeat pap smear performed due to history of abnormal pap smear last year  No dysplasia on colposcopy specimens  Health Maintenance:    She exercises 3 days per week  She wears her seatbelt routinely  She does not perform regular monthly self breast exams  She feels safe at home  Patients does not follow a particular diet  Past Medical History:   Diagnosis Date    Abnormal Pap smear of cervix     Asthma     Disease of thyroid gland     enlarged thyroid    HPV (human papilloma virus) infection     Varicella     Visual impairment     contacts in       Past Surgical History:   Procedure Laterality Date    TONSILECTOMY AND ADNOIDECTOMY      WISDOM TOOTH EXTRACTION         Past OB/Gyn History:  Period Cycle (Days): 28  Period Duration (Days): 3 to 4 days   Period Pattern: Regular  Menstrual Flow: Moderate  Menstrual Control: Tampon  Dysmenorrhea: NonePatient's last menstrual period was 2020 (exact date)    Menstrual History:  OB History        2    Para   2    Term   2            AB   0    Living   2       SAB TAB        Ectopic        Multiple   0    Live Births   2           Obstetric Comments   Menarche 10            Menarche age: 12yo  Patient's last menstrual period was 07/18/2020 (exact date)  Period Cycle (Days): 28  Period Duration (Days): 3 to 4 days   Period Pattern: Regular  Menstrual Flow: Moderate  Menstrual Control: Tampon  Dysmenorrhea: None    History of sexually transmitted infection No  Patient is currently sexually active  heterosexual Birth control: male vasectomy  Last Pap 2019 :  ASCUS with POSITIVE high risk HPV      Family History   Problem Relation Age of Onset    Diabetes Mother     Hypertension Mother     Pulmonary embolism Mother     Deep vein thrombosis Mother     Hypertension Father     Prostate cancer Father     No Known Problems Maternal Grandmother     No Known Problems Maternal Grandfather     Prostate cancer Paternal Grandfather     Colon cancer Paternal Grandfather         pt unsure if prostate or colon CA     No Known Problems Sister     No Known Problems Daughter     No Known Problems Son     Breast cancer Paternal Grandmother        Social History:  Social History     Socioeconomic History    Marital status: /Civil Union     Spouse name: Not on file    Number of children: Not on file    Years of education: Not on file    Highest education level: Not on file   Occupational History    Not on file   Social Needs    Financial resource strain: Not on file    Food insecurity:     Worry: Not on file     Inability: Not on file    Transportation needs:     Medical: Not on file     Non-medical: Not on file   Tobacco Use    Smoking status: Never Smoker    Smokeless tobacco: Never Used   Substance and Sexual Activity    Alcohol use: No    Drug use: Never    Sexual activity: Yes     Partners: Male     Birth control/protection: None   Lifestyle    Physical activity:     Days per week: Not on file     Minutes per session: Not on file    Stress: Not on file Relationships    Social connections:     Talks on phone: Not on file     Gets together: Not on file     Attends Denominational service: Not on file     Active member of club or organization: Not on file     Attends meetings of clubs or organizations: Not on file     Relationship status: Not on file    Intimate partner violence:     Fear of current or ex partner: Not on file     Emotionally abused: Not on file     Physically abused: Not on file     Forced sexual activity: Not on file   Other Topics Concern    Not on file   Social History Narrative    Not on file     Presently lives with  and children  Patient is   Patient is currently employed as  for [employer]  Allergies   Allergen Reactions    Nuts Anaphylaxis    Advair Diskus [Fluticasone-Salmeterol] Hives    Shellfish-Derived Products     Tylenol [Acetaminophen]        Current Outpatient Medications:     ibuprofen (MOTRIN) 200 mg tablet, Take by mouth every 6 (six) hours as needed for mild pain, Disp: , Rfl:     Multiple Vitamin (MULTIVITAMIN) tablet, Take 1 tablet by mouth daily  , Disp: , Rfl:     EPINEPHrine (EpiPen 2-Cem) 0 3 mg/0 3 mL SOAJ, Inject 0 3 mL (0 3 mg total) into a muscle once for 1 dose, Disp: 2 each, Rfl: 1    Review of Systems:  Review of Systems   Constitutional: Negative for activity change, appetite change and unexpected weight change  Eyes: Negative for visual disturbance  Respiratory: Negative for cough, chest tightness and shortness of breath  Cardiovascular: Negative for chest pain  Gastrointestinal: Negative for abdominal pain, constipation, diarrhea, nausea and vomiting  Endocrine: Negative for polyuria  Genitourinary: Negative for difficulty urinating, dyspareunia, dysuria, frequency, menstrual problem, pelvic pain, vaginal bleeding, vaginal discharge and vaginal pain  Musculoskeletal: Negative for back pain     Neurological: Negative for dizziness, weakness, light-headedness and headaches  Psychiatric/Behavioral: Negative  Physical Exam:  /60   Temp 98 3 °F (36 8 °C)   Ht 5' 6" (1 676 m)   Wt 93 kg (205 lb)   LMP 07/18/2020 (Exact Date)   Breastfeeding No   BMI 33 09 kg/m²    Physical Exam   Constitutional: She is oriented to person, place, and time  She appears well-developed and well-nourished  No distress  Genitourinary: Vagina normal and uterus normal  There is no rash, tenderness or lesion on the right labia  There is no rash, tenderness or lesion on the left labia  Vagina exhibits rugosity  Vagina exhibits no lesion  No erythema, tenderness or bleeding in the vagina  No vaginal discharge found  Right adnexum does not display mass, does not display tenderness and does not display fullness  Left adnexum does not display mass, does not display tenderness and does not display fullness  Cervix exhibits pinkness  Cervix does not exhibit motion tenderness, lesion, discharge, friability or polyp  Uterus is mobile and anteverted  Uterus is not enlarged, tender or exhibiting a mass  Rectal exam shows no external hemorrhoid and no tenderness  Genitourinary Comments: Urethra midline, no masses  Urethral meatus normal in appearance  Bladder nontender to palpation  Perineum normal in appearance, no lacerations, no ulcerations, no lesions visualized  HENT:   Head: Normocephalic and atraumatic  Cardiovascular: Normal rate, regular rhythm and normal heart sounds  Exam reveals no friction rub  No murmur heard  Pulmonary/Chest: Effort normal and breath sounds normal  No respiratory distress  She has no wheezes  She has no rales  Right breast exhibits no mass, no skin change and no tenderness  Left breast exhibits no mass, no skin change and no tenderness  No breast swelling, discharge or bleeding  Breasts are symmetrical    Abdominal: Soft  She exhibits no mass  There is no tenderness  There is no guarding     Musculoskeletal: Normal range of motion  She exhibits no edema or tenderness  Lymphadenopathy:        Right: No inguinal adenopathy present  Left: No inguinal adenopathy present  Neurological: She is alert and oriented to person, place, and time  Skin: Skin is warm and dry  She is not diaphoretic  No erythema  No pallor  Psychiatric: She has a normal mood and affect  Her behavior is normal  Judgment and thought content normal    Nursing note and vitals reviewed

## 2020-07-31 LAB
LAB AP GYN PRIMARY INTERPRETATION: ABNORMAL
Lab: ABNORMAL
PATH INTERP SPEC-IMP: ABNORMAL

## 2020-07-31 NOTE — RESULT ENCOUNTER NOTE
ASCUS and atypical glandular cells on pap smear  HPV 18 pos  HPV 16 negative  Needs colposcopy with ECC and EMB  Please call patient to review and schedule colposcopy

## 2020-10-01 ENCOUNTER — APPOINTMENT (EMERGENCY)
Dept: ULTRASOUND IMAGING | Facility: HOSPITAL | Age: 35
End: 2020-10-01
Payer: COMMERCIAL

## 2020-10-01 ENCOUNTER — HOSPITAL ENCOUNTER (EMERGENCY)
Facility: HOSPITAL | Age: 35
Discharge: HOME | End: 2020-10-01
Attending: EMERGENCY MEDICINE | Admitting: EMERGENCY MEDICINE
Payer: COMMERCIAL

## 2020-10-01 ENCOUNTER — OFFICE VISIT (OUTPATIENT)
Dept: URGENT CARE | Age: 35
End: 2020-10-01
Payer: COMMERCIAL

## 2020-10-01 VITALS
WEIGHT: 203.04 LBS | HEIGHT: 66 IN | OXYGEN SATURATION: 98 % | BODY MASS INDEX: 32.63 KG/M2 | RESPIRATION RATE: 18 BRPM | SYSTOLIC BLOOD PRESSURE: 100 MMHG | DIASTOLIC BLOOD PRESSURE: 60 MMHG | TEMPERATURE: 98.1 F | HEART RATE: 70 BPM

## 2020-10-01 VITALS
DIASTOLIC BLOOD PRESSURE: 66 MMHG | WEIGHT: 203 LBS | OXYGEN SATURATION: 98 % | HEART RATE: 74 BPM | HEIGHT: 66 IN | RESPIRATION RATE: 20 BRPM | TEMPERATURE: 98.1 F | BODY MASS INDEX: 32.62 KG/M2 | SYSTOLIC BLOOD PRESSURE: 113 MMHG

## 2020-10-01 DIAGNOSIS — R10.13 EPIGASTRIC ABDOMINAL PAIN: Primary | ICD-10-CM

## 2020-10-01 DIAGNOSIS — R10.9 ABDOMINAL PAIN: Primary | ICD-10-CM

## 2020-10-01 LAB
ALBUMIN SERPL BCP-MCNC: 4.1 G/DL (ref 3.5–5)
ALP SERPL-CCNC: 106 U/L (ref 46–116)
ALT SERPL W P-5'-P-CCNC: 21 U/L (ref 12–78)
ANION GAP SERPL CALCULATED.3IONS-SCNC: 9 MMOL/L (ref 4–13)
AST SERPL W P-5'-P-CCNC: 14 U/L (ref 5–45)
BACTERIA UR QL AUTO: ABNORMAL /HPF
BASOPHILS # BLD AUTO: 0.04 THOUSANDS/ΜL (ref 0–0.1)
BASOPHILS NFR BLD AUTO: 0 % (ref 0–1)
BILIRUB SERPL-MCNC: 0.31 MG/DL (ref 0.2–1)
BILIRUB UR QL STRIP: NEGATIVE
BUN SERPL-MCNC: 12 MG/DL (ref 5–25)
CALCIUM SERPL-MCNC: 9.1 MG/DL (ref 8.3–10.1)
CHLORIDE SERPL-SCNC: 102 MMOL/L (ref 100–108)
CLARITY UR: ABNORMAL
CO2 SERPL-SCNC: 27 MMOL/L (ref 21–32)
COLOR UR: YELLOW
CREAT SERPL-MCNC: 0.78 MG/DL (ref 0.6–1.3)
EOSINOPHIL # BLD AUTO: 0.51 THOUSAND/ΜL (ref 0–0.61)
EOSINOPHIL NFR BLD AUTO: 5 % (ref 0–6)
ERYTHROCYTE [DISTWIDTH] IN BLOOD BY AUTOMATED COUNT: 12.6 % (ref 11.6–15.1)
EXT PREG TEST URINE: NEGATIVE
EXT. CONTROL ED NAV: NORMAL
GFR SERPL CREATININE-BSD FRML MDRD: 100 ML/MIN/1.73SQ M
GLUCOSE SERPL-MCNC: 95 MG/DL (ref 65–140)
GLUCOSE UR STRIP-MCNC: NEGATIVE MG/DL
HCT VFR BLD AUTO: 44.1 % (ref 34.8–46.1)
HGB BLD-MCNC: 14.2 G/DL (ref 11.5–15.4)
HGB UR QL STRIP.AUTO: NEGATIVE
HOLD SPECIMEN: NORMAL
IMM GRANULOCYTES # BLD AUTO: 0.04 THOUSAND/UL (ref 0–0.2)
IMM GRANULOCYTES NFR BLD AUTO: 0 % (ref 0–2)
KETONES UR STRIP-MCNC: NEGATIVE MG/DL
LEUKOCYTE ESTERASE UR QL STRIP: ABNORMAL
LIPASE SERPL-CCNC: 105 U/L (ref 73–393)
LYMPHOCYTES # BLD AUTO: 2.45 THOUSANDS/ΜL (ref 0.6–4.47)
LYMPHOCYTES NFR BLD AUTO: 24 % (ref 14–44)
MCH RBC QN AUTO: 28.4 PG (ref 26.8–34.3)
MCHC RBC AUTO-ENTMCNC: 32.2 G/DL (ref 31.4–37.4)
MCV RBC AUTO: 88 FL (ref 82–98)
MONOCYTES # BLD AUTO: 0.63 THOUSAND/ΜL (ref 0.17–1.22)
MONOCYTES NFR BLD AUTO: 6 % (ref 4–12)
NEUTROPHILS # BLD AUTO: 6.45 THOUSANDS/ΜL (ref 1.85–7.62)
NEUTS SEG NFR BLD AUTO: 65 % (ref 43–75)
NITRITE UR QL STRIP: NEGATIVE
NON-SQ EPI CELLS URNS QL MICRO: ABNORMAL /HPF
NRBC BLD AUTO-RTO: 0 /100 WBCS
PH UR STRIP.AUTO: 5.5 [PH]
PLATELET # BLD AUTO: 332 THOUSANDS/UL (ref 149–390)
PMV BLD AUTO: 9.2 FL (ref 8.9–12.7)
POTASSIUM SERPL-SCNC: 4 MMOL/L (ref 3.5–5.3)
PROT SERPL-MCNC: 8.4 G/DL (ref 6.4–8.2)
PROT UR STRIP-MCNC: NEGATIVE MG/DL
RBC # BLD AUTO: 5 MILLION/UL (ref 3.81–5.12)
RBC #/AREA URNS AUTO: ABNORMAL /HPF
SODIUM SERPL-SCNC: 138 MMOL/L (ref 136–145)
SP GR UR STRIP.AUTO: 1.02 (ref 1–1.03)
UROBILINOGEN UR QL STRIP.AUTO: 0.2 E.U./DL
WBC # BLD AUTO: 10.12 THOUSAND/UL (ref 4.31–10.16)
WBC #/AREA URNS AUTO: ABNORMAL /HPF

## 2020-10-01 PROCEDURE — 81001 URINALYSIS AUTO W/SCOPE: CPT | Performed by: EMERGENCY MEDICINE

## 2020-10-01 PROCEDURE — 76705 ECHO EXAM OF ABDOMEN: CPT

## 2020-10-01 PROCEDURE — 83690 ASSAY OF LIPASE: CPT | Performed by: EMERGENCY MEDICINE

## 2020-10-01 PROCEDURE — S9083 URGENT CARE CENTER GLOBAL: HCPCS | Performed by: PHYSICIAN ASSISTANT

## 2020-10-01 PROCEDURE — 99284 EMERGENCY DEPT VISIT MOD MDM: CPT

## 2020-10-01 PROCEDURE — 96374 THER/PROPH/DIAG INJ IV PUSH: CPT

## 2020-10-01 PROCEDURE — 87086 URINE CULTURE/COLONY COUNT: CPT | Performed by: EMERGENCY MEDICINE

## 2020-10-01 PROCEDURE — G0382 LEV 3 HOSP TYPE B ED VISIT: HCPCS | Performed by: PHYSICIAN ASSISTANT

## 2020-10-01 PROCEDURE — 96375 TX/PRO/DX INJ NEW DRUG ADDON: CPT

## 2020-10-01 PROCEDURE — 81025 URINE PREGNANCY TEST: CPT | Performed by: EMERGENCY MEDICINE

## 2020-10-01 PROCEDURE — 36415 COLL VENOUS BLD VENIPUNCTURE: CPT | Performed by: EMERGENCY MEDICINE

## 2020-10-01 PROCEDURE — 85025 COMPLETE CBC W/AUTO DIFF WBC: CPT | Performed by: EMERGENCY MEDICINE

## 2020-10-01 PROCEDURE — 99284 EMERGENCY DEPT VISIT MOD MDM: CPT | Performed by: EMERGENCY MEDICINE

## 2020-10-01 PROCEDURE — 80053 COMPREHEN METABOLIC PANEL: CPT | Performed by: EMERGENCY MEDICINE

## 2020-10-01 RX ORDER — ONDANSETRON 2 MG/ML
4 INJECTION INTRAMUSCULAR; INTRAVENOUS ONCE
Status: COMPLETED | OUTPATIENT
Start: 2020-10-01 | End: 2020-10-01

## 2020-10-01 RX ORDER — SUCRALFATE 1 G/1
1 TABLET ORAL 4 TIMES DAILY
Qty: 28 TABLET | Refills: 0 | Status: SHIPPED | OUTPATIENT
Start: 2020-10-01 | End: 2021-10-20

## 2020-10-01 RX ORDER — KETOROLAC TROMETHAMINE 30 MG/ML
15 INJECTION, SOLUTION INTRAMUSCULAR; INTRAVENOUS ONCE
Status: COMPLETED | OUTPATIENT
Start: 2020-10-01 | End: 2020-10-01

## 2020-10-01 RX ORDER — SUCRALFATE ORAL 1 G/10ML
1000 SUSPENSION ORAL ONCE
Status: COMPLETED | OUTPATIENT
Start: 2020-10-01 | End: 2020-10-01

## 2020-10-01 RX ORDER — FAMOTIDINE 20 MG/1
20 TABLET, FILM COATED ORAL 2 TIMES DAILY
Qty: 30 TABLET | Refills: 0 | Status: SHIPPED | OUTPATIENT
Start: 2020-10-01 | End: 2020-10-09 | Stop reason: SDUPTHER

## 2020-10-01 RX ADMIN — ONDANSETRON 4 MG: 2 INJECTION INTRAMUSCULAR; INTRAVENOUS at 22:44

## 2020-10-01 RX ADMIN — KETOROLAC TROMETHAMINE 15 MG: 30 INJECTION, SOLUTION INTRAMUSCULAR at 22:44

## 2020-10-01 RX ADMIN — SUCRALFATE 1000 MG: 1 SUSPENSION ORAL at 23:46

## 2020-10-01 RX ADMIN — FAMOTIDINE 20 MG: 10 INJECTION, SOLUTION INTRAVENOUS at 23:46

## 2020-10-03 LAB — BACTERIA UR CULT: NORMAL

## 2020-10-05 ENCOUNTER — TELEPHONE (OUTPATIENT)
Dept: OBGYN CLINIC | Facility: CLINIC | Age: 35
End: 2020-10-05

## 2020-10-09 ENCOUNTER — OFFICE VISIT (OUTPATIENT)
Dept: GASTROENTEROLOGY | Facility: AMBULARY SURGERY CENTER | Age: 35
End: 2020-10-09
Payer: COMMERCIAL

## 2020-10-09 VITALS — WEIGHT: 200.4 LBS | BODY MASS INDEX: 32.21 KG/M2 | HEIGHT: 66 IN | TEMPERATURE: 97.8 F

## 2020-10-09 DIAGNOSIS — R10.9 ABDOMINAL PAIN: ICD-10-CM

## 2020-10-09 DIAGNOSIS — R10.13 EPIGASTRIC ABDOMINAL PAIN: Primary | ICD-10-CM

## 2020-10-09 PROCEDURE — 99204 OFFICE O/P NEW MOD 45 MIN: CPT | Performed by: INTERNAL MEDICINE

## 2020-10-09 RX ORDER — PANTOPRAZOLE SODIUM 40 MG/1
40 TABLET, DELAYED RELEASE ORAL DAILY
Qty: 30 TABLET | Refills: 3 | Status: SHIPPED | OUTPATIENT
Start: 2020-10-09

## 2020-10-09 RX ORDER — FAMOTIDINE 20 MG/1
40 TABLET, FILM COATED ORAL
Qty: 30 TABLET | Refills: 3 | Status: SHIPPED | OUTPATIENT
Start: 2020-10-09

## 2020-10-09 RX ORDER — DICYCLOMINE HYDROCHLORIDE 10 MG/1
10 CAPSULE ORAL
Qty: 90 CAPSULE | Refills: 3 | Status: SHIPPED | OUTPATIENT
Start: 2020-10-09

## 2020-10-12 ENCOUNTER — PROCEDURE VISIT (OUTPATIENT)
Dept: OBGYN CLINIC | Facility: CLINIC | Age: 35
End: 2020-10-12
Payer: COMMERCIAL

## 2020-10-12 VITALS
TEMPERATURE: 98.6 F | BODY MASS INDEX: 32.44 KG/M2 | WEIGHT: 201 LBS | SYSTOLIC BLOOD PRESSURE: 122 MMHG | DIASTOLIC BLOOD PRESSURE: 68 MMHG

## 2020-10-12 DIAGNOSIS — R87.619 ATYPICAL GLANDULAR CELLS ON CERVICAL PAP SMEAR: ICD-10-CM

## 2020-10-12 DIAGNOSIS — R87.610 ASCUS WITH POSITIVE HIGH RISK HPV CERVICAL: Primary | ICD-10-CM

## 2020-10-12 DIAGNOSIS — R87.810 ASCUS WITH POSITIVE HIGH RISK HPV CERVICAL: Primary | ICD-10-CM

## 2020-10-12 LAB — SL AMB POCT URINE HCG: NEGATIVE

## 2020-10-12 PROCEDURE — 88305 TISSUE EXAM BY PATHOLOGIST: CPT | Performed by: PATHOLOGY

## 2020-10-12 PROCEDURE — 81025 URINE PREGNANCY TEST: CPT | Performed by: OBSTETRICS & GYNECOLOGY

## 2020-10-12 PROCEDURE — 57454 BX/CURETT OF CERVIX W/SCOPE: CPT | Performed by: OBSTETRICS & GYNECOLOGY

## 2020-12-31 ENCOUNTER — NURSE TRIAGE (OUTPATIENT)
Dept: OTHER | Facility: OTHER | Age: 35
End: 2020-12-31

## 2020-12-31 DIAGNOSIS — Z20.828 EXPOSURE TO SARS-ASSOCIATED CORONAVIRUS: Primary | ICD-10-CM

## 2020-12-31 DIAGNOSIS — Z20.828 EXPOSURE TO SARS-ASSOCIATED CORONAVIRUS: ICD-10-CM

## 2020-12-31 PROCEDURE — U0003 INFECTIOUS AGENT DETECTION BY NUCLEIC ACID (DNA OR RNA); SEVERE ACUTE RESPIRATORY SYNDROME CORONAVIRUS 2 (SARS-COV-2) (CORONAVIRUS DISEASE [COVID-19]), AMPLIFIED PROBE TECHNIQUE, MAKING USE OF HIGH THROUGHPUT TECHNOLOGIES AS DESCRIBED BY CMS-2020-01-R: HCPCS | Performed by: FAMILY MEDICINE

## 2021-01-01 ENCOUNTER — TELEPHONE (OUTPATIENT)
Dept: GASTROENTEROLOGY | Facility: CLINIC | Age: 36
End: 2021-01-01

## 2021-01-01 LAB — SARS-COV-2 RNA SPEC QL NAA+PROBE: DETECTED

## 2021-01-01 NOTE — TELEPHONE ENCOUNTER
Your test for the novel coronavirus, also known as COVID-19, was positive  The sample showed that the virus was present  Positive COVID-19 test results are reportable to the [place] Department of Health  You may receive a call from trained public health staff to conduct an interview  It is important to answer their call  They will ask you to verify who you are  During the call they will ask you about what symptoms you have, what you did before you got sick, and who you were close to while sick  The health department does this to make sure everyone stays healthy and to reduce the spread of the virus  If you would like to verify if the caller does in fact work in contact tracing, call the [address] [place] [address] at [employer] ([phone])  For additional information, please visit the [facility]  website: [email or web address]     If you have any additional questions, we can schedule a virtual visit for you with a provider or call the [facility] hotline [phone], option 7, for care advice    For additional information, please visit the Coronavirus FAQ on the [facility] home page ([email or web address])

## 2021-03-26 DIAGNOSIS — Z23 ENCOUNTER FOR IMMUNIZATION: ICD-10-CM

## 2021-07-29 ENCOUNTER — OFFICE VISIT (OUTPATIENT)
Dept: OBGYN CLINIC | Facility: CLINIC | Age: 36
End: 2021-07-29
Payer: COMMERCIAL

## 2021-07-29 VITALS
WEIGHT: 202 LBS | SYSTOLIC BLOOD PRESSURE: 100 MMHG | HEIGHT: 66 IN | DIASTOLIC BLOOD PRESSURE: 58 MMHG | BODY MASS INDEX: 32.47 KG/M2

## 2021-07-29 DIAGNOSIS — R87.810 ATYPICAL SQUAMOUS CELL CHANGES OF UNDETERMINED SIGNIFICANCE (ASCUS) ON CERVICAL CYTOLOGY WITH POSITIVE HIGH RISK HUMAN PAPILLOMA VIRUS (HPV): ICD-10-CM

## 2021-07-29 DIAGNOSIS — R87.610 ATYPICAL SQUAMOUS CELL CHANGES OF UNDETERMINED SIGNIFICANCE (ASCUS) ON CERVICAL CYTOLOGY WITH POSITIVE HIGH RISK HUMAN PAPILLOMA VIRUS (HPV): ICD-10-CM

## 2021-07-29 DIAGNOSIS — Z12.4 ENCOUNTER FOR SCREENING FOR MALIGNANT NEOPLASM OF CERVIX: ICD-10-CM

## 2021-07-29 DIAGNOSIS — Z01.419 ENCNTR FOR GYN EXAM (GENERAL) (ROUTINE) W/O ABN FINDINGS: Primary | ICD-10-CM

## 2021-07-29 PROCEDURE — G0145 SCR C/V CYTO,THINLAYER,RESCR: HCPCS | Performed by: NURSE PRACTITIONER

## 2021-07-29 PROCEDURE — 99395 PREV VISIT EST AGE 18-39: CPT | Performed by: NURSE PRACTITIONER

## 2021-07-29 PROCEDURE — G0476 HPV COMBO ASSAY CA SCREEN: HCPCS | Performed by: NURSE PRACTITIONER

## 2021-07-29 NOTE — PATIENT INSTRUCTIONS
Pap with high risk HPV testing every 5 years, if normal  Done today  Sexually transmitted infection testing as indicated  Declined  Exercise most days of week-minimum of 150 minutes per week  Obtain appropriate diet and hydration  Calcium 1000mg + 600 vit D daily  HPV 9 vaccine recommended through age 39  Check with your insurance for coverage  If covered, call office to schedule start of vaccine series  Annual mammogram starting at age 36, monthly breast self exam  [facility]  Discussed Pap process, HPV testing, transmission and treatment

## 2021-07-29 NOTE — PROGRESS NOTES
Assessment/Plan:    Pap with high risk HPV testing every 5 years, if normal  Done today  Sexually transmitted infection testing as indicated  Declined  Exercise most days of week-minimum of 150 minutes per week  Obtain appropriate diet and hydration  Calcium 1000mg + 600 vit D daily  HPV 9 vaccine recommended through age 39  Check with your insurance for coverage  If covered, call office to schedule start of vaccine series  Annual mammogram starting at age 36, monthly breast self exam  [facility] 20 times twice daily  Discussed Pap process, HPV testing, transmission and treatment  Diagnoses and all orders for this visit:    Encntr for gyn exam (general) (routine) w/o abn findings  -     Liquid-based pap, screening    Atypical squamous cell changes of undetermined significance (ASCUS) on cervical cytology with positive high risk human papilloma virus (HPV)  -     Liquid-based pap, screening    Encounter for screening for malignant neoplasm of cervix  -     Liquid-based pap, screening    BMI 32 0-32 9,adult          Subjective:      Patient ID: Markie Santos is a 31 y o  female  Markie Santos is a 31 y  o  ( 8 boy, 5 girl)  female who is here today as a new patient for her annual visit  No health concerns  Monthly menses x 3 days with moderate flow  Menses is acceptable  Exercises 2 times per month  Markie Santos is sexually active with  of 12 years  She feels safe in her home/relationship   had a vasectomy  She is a reading and  at [employer]  19 ASCUS + HR HPV 18  19 normal colp  20 ASCUS, Atypical glandular + HR HPV 18  10/12/20 Normal colp and benign EBX      The following portions of the patient's history were reviewed and updated as appropriate: allergies, current medications, past family history, past medical history, past social history, past surgical history and problem list     Review of Systems   Constitutional: Negative  Negative for activity change, appetite change, chills, diaphoresis, fatigue, fever and unexpected weight change  HENT: Negative for congestion, dental problem, sneezing, sore throat and trouble swallowing  Eyes: Negative for visual disturbance  Respiratory: Negative for chest tightness and shortness of breath  Cardiovascular: Negative for chest pain and leg swelling  Gastrointestinal: Negative for abdominal pain, constipation, diarrhea, nausea and vomiting  Genitourinary: Negative for difficulty urinating, dyspareunia, dysuria, frequency, hematuria, menstrual problem, pelvic pain, urgency, vaginal bleeding, vaginal discharge and vaginal pain  Musculoskeletal: Negative for back pain and neck pain  Skin: Negative  Allergic/Immunologic: Negative  Neurological: Negative for weakness and headaches  Hematological: Negative for adenopathy  Psychiatric/Behavioral: Negative  Objective:      /58 (BP Location: Left arm, Patient Position: Sitting, Cuff Size: Standard)   Ht 5' 6" (1 676 m)   Wt 91 6 kg (202 lb)   LMP 07/08/2021 (Exact Date)   BMI 32 60 kg/m²          Physical Exam  Vitals and nursing note reviewed  Constitutional:       Appearance: Normal appearance  She is well-developed  She is obese  HENT:      Head: Normocephalic and atraumatic  Eyes:      General:         Right eye: No discharge  Left eye: No discharge  Neck:      Thyroid: No thyromegaly  Trachea: Trachea normal    Cardiovascular:      Rate and Rhythm: Normal rate and regular rhythm  Heart sounds: Normal heart sounds  Pulmonary:      Effort: Pulmonary effort is normal       Breath sounds: Normal breath sounds  Chest:      Breasts: Breasts are symmetrical          Right: Normal  No inverted nipple, mass, nipple discharge, skin change or tenderness  Left: Normal  No inverted nipple, mass, nipple discharge, skin change or tenderness     Abdominal:      Palpations: Abdomen is soft    Genitourinary:     General: Normal vulva  Exam position: Lithotomy position  Labia:         Right: No rash, tenderness, lesion or injury  Left: No rash, tenderness, lesion or injury  Urethra: No prolapse, urethral pain, urethral swelling or urethral lesion  Vagina: Normal  No signs of injury and foreign body  No vaginal discharge, erythema, tenderness or bleeding  Cervix: Normal       Uterus: Normal        Adnexa: Right adnexa normal and left adnexa normal         Right: No mass, tenderness or fullness  Left: No mass, tenderness or fullness  Rectum: No external hemorrhoid  Musculoskeletal:         General: Normal range of motion  Cervical back: Normal range of motion and neck supple  Lymphadenopathy:      Head:      Right side of head: No submental, submandibular or tonsillar adenopathy  Left side of head: No submental, submandibular or tonsillar adenopathy  Cervical: No cervical adenopathy  Upper Body:      Right upper body: No supraclavicular or axillary adenopathy  Left upper body: No supraclavicular or axillary adenopathy  Lower Body: No right inguinal adenopathy  No left inguinal adenopathy  Skin:     General: Skin is warm and dry  Neurological:      Mental Status: She is alert and oriented to person, place, and time     Psychiatric:         Mood and Affect: Mood normal          Behavior: Behavior normal

## 2021-08-02 LAB
HPV HR 12 DNA CVX QL NAA+PROBE: NEGATIVE
HPV16 DNA CVX QL NAA+PROBE: NEGATIVE
HPV18 DNA CVX QL NAA+PROBE: NEGATIVE

## 2021-08-06 LAB
LAB AP GYN PRIMARY INTERPRETATION: NORMAL
Lab: NORMAL
PATH INTERP SPEC-IMP: NORMAL

## 2021-08-09 ENCOUNTER — TELEPHONE (OUTPATIENT)
Dept: OBGYN CLINIC | Facility: CLINIC | Age: 36
End: 2021-08-09

## 2021-08-09 NOTE — TELEPHONE ENCOUNTER
----- Message from [facility], [facility] De[facility]  sent at 8/7/2021  6:57 AM EDT -----  Normal pap and negative high risk HPV  No signs or symptoms of a bacterial infection noted at time of visit

## 2021-10-01 ENCOUNTER — TELEPHONE (OUTPATIENT)
Dept: FAMILY MEDICINE CLINIC | Facility: CLINIC | Age: 36
End: 2021-10-01

## 2021-10-04 ENCOUNTER — TELEPHONE (OUTPATIENT)
Dept: OBGYN CLINIC | Facility: CLINIC | Age: 36
End: 2021-10-04

## 2021-10-13 ENCOUNTER — RA CDI HCC (OUTPATIENT)
Dept: OTHER | Facility: HOSPITAL | Age: 36
End: 2021-10-13

## 2021-10-20 ENCOUNTER — OFFICE VISIT (OUTPATIENT)
Dept: FAMILY MEDICINE CLINIC | Facility: CLINIC | Age: 36
End: 2021-10-20

## 2021-10-20 VITALS
HEART RATE: 76 BPM | OXYGEN SATURATION: 98 % | RESPIRATION RATE: 18 BRPM | SYSTOLIC BLOOD PRESSURE: 114 MMHG | BODY MASS INDEX: 33.43 KG/M2 | WEIGHT: 208 LBS | TEMPERATURE: 98.1 F | DIASTOLIC BLOOD PRESSURE: 78 MMHG | HEIGHT: 66 IN

## 2021-10-20 DIAGNOSIS — Z00.00 ANNUAL PHYSICAL EXAM: Primary | ICD-10-CM

## 2021-10-20 DIAGNOSIS — Z28.21 INFLUENZA VACCINATION DECLINED: ICD-10-CM

## 2021-10-20 DIAGNOSIS — R53.83 FATIGUE: ICD-10-CM

## 2021-10-20 DIAGNOSIS — T78.2XXD ANAPHYLAXIS, SUBSEQUENT ENCOUNTER: ICD-10-CM

## 2021-10-20 DIAGNOSIS — Z13.6 SCREENING FOR CARDIOVASCULAR CONDITION: ICD-10-CM

## 2021-10-20 PROCEDURE — 3008F BODY MASS INDEX DOCD: CPT | Performed by: FAMILY MEDICINE

## 2021-10-20 PROCEDURE — 1036F TOBACCO NON-USER: CPT | Performed by: FAMILY MEDICINE

## 2021-10-20 PROCEDURE — 99395 PREV VISIT EST AGE 18-39: CPT | Performed by: FAMILY MEDICINE

## 2021-10-20 RX ORDER — EPINEPHRINE 0.3 MG/.3ML
0.3 INJECTION SUBCUTANEOUS ONCE
Qty: 2 EACH | Refills: 0 | Status: SHIPPED | OUTPATIENT
Start: 2021-10-20 | End: 2021-10-20

## 2021-10-30 ENCOUNTER — APPOINTMENT (OUTPATIENT)
Dept: LAB | Facility: MEDICAL CENTER | Age: 36
End: 2021-10-30
Payer: COMMERCIAL

## 2021-10-30 DIAGNOSIS — Z13.6 SCREENING FOR CARDIOVASCULAR CONDITION: ICD-10-CM

## 2021-10-30 DIAGNOSIS — R53.83 FATIGUE: ICD-10-CM

## 2021-10-30 LAB
ALBUMIN SERPL BCP-MCNC: 3.7 G/DL (ref 3.5–5)
ALP SERPL-CCNC: 91 U/L (ref 46–116)
ALT SERPL W P-5'-P-CCNC: 21 U/L (ref 12–78)
ANION GAP SERPL CALCULATED.3IONS-SCNC: 4 MMOL/L (ref 4–13)
AST SERPL W P-5'-P-CCNC: 11 U/L (ref 5–45)
BILIRUB SERPL-MCNC: 0.66 MG/DL (ref 0.2–1)
BUN SERPL-MCNC: 13 MG/DL (ref 5–25)
CALCIUM SERPL-MCNC: 9 MG/DL (ref 8.3–10.1)
CHLORIDE SERPL-SCNC: 106 MMOL/L (ref 100–108)
CHOLEST SERPL-MCNC: 192 MG/DL (ref 50–200)
CO2 SERPL-SCNC: 25 MMOL/L (ref 21–32)
CREAT SERPL-MCNC: 0.87 MG/DL (ref 0.6–1.3)
GFR SERPL CREATININE-BSD FRML MDRD: 87 ML/MIN/1.73SQ M
GLUCOSE P FAST SERPL-MCNC: 85 MG/DL (ref 65–99)
HDLC SERPL-MCNC: 46 MG/DL
LDLC SERPL CALC-MCNC: 114 MG/DL (ref 0–100)
NONHDLC SERPL-MCNC: 146 MG/DL
POTASSIUM SERPL-SCNC: 4.6 MMOL/L (ref 3.5–5.3)
PROT SERPL-MCNC: 8.3 G/DL (ref 6.4–8.2)
SODIUM SERPL-SCNC: 135 MMOL/L (ref 136–145)
TRIGL SERPL-MCNC: 158 MG/DL
TSH SERPL DL<=0.05 MIU/L-ACNC: 0.96 UIU/ML (ref 0.36–3.74)

## 2021-10-30 PROCEDURE — 80053 COMPREHEN METABOLIC PANEL: CPT

## 2021-10-30 PROCEDURE — 36415 COLL VENOUS BLD VENIPUNCTURE: CPT

## 2021-10-30 PROCEDURE — 84443 ASSAY THYROID STIM HORMONE: CPT

## 2021-10-30 PROCEDURE — 80061 LIPID PANEL: CPT

## 2021-11-04 ENCOUNTER — TELEPHONE (OUTPATIENT)
Dept: FAMILY MEDICINE CLINIC | Facility: CLINIC | Age: 36
End: 2021-11-04

## 2024-01-02 ENCOUNTER — OFFICE VISIT (OUTPATIENT)
Dept: FAMILY MEDICINE CLINIC | Facility: CLINIC | Age: 39
End: 2024-01-02

## 2024-01-02 VITALS
DIASTOLIC BLOOD PRESSURE: 75 MMHG | HEIGHT: 66 IN | WEIGHT: 198.6 LBS | SYSTOLIC BLOOD PRESSURE: 117 MMHG | TEMPERATURE: 98.3 F | RESPIRATION RATE: 18 BRPM | HEART RATE: 76 BPM | BODY MASS INDEX: 31.92 KG/M2

## 2024-01-02 DIAGNOSIS — T78.2XXD ANAPHYLAXIS, SUBSEQUENT ENCOUNTER: ICD-10-CM

## 2024-01-02 DIAGNOSIS — J02.9 SORE THROAT: ICD-10-CM

## 2024-01-02 DIAGNOSIS — R09.82 POST-NASAL DRIP: Primary | ICD-10-CM

## 2024-01-02 LAB — S PYO AG THROAT QL: NEGATIVE

## 2024-01-02 PROCEDURE — 99213 OFFICE O/P EST LOW 20 MIN: CPT | Performed by: FAMILY MEDICINE

## 2024-01-02 PROCEDURE — 87880 STREP A ASSAY W/OPTIC: CPT | Performed by: FAMILY MEDICINE

## 2024-01-02 RX ORDER — GUAIFENESIN 600 MG/1
1200 TABLET, EXTENDED RELEASE ORAL EVERY 12 HOURS SCHEDULED
Qty: 30 TABLET | Refills: 0 | Status: SHIPPED | OUTPATIENT
Start: 2024-01-02

## 2024-01-02 RX ORDER — EPINEPHRINE 0.3 MG/.3ML
0.3 INJECTION SUBCUTANEOUS ONCE
Qty: 2 EACH | Refills: 0 | Status: SHIPPED | OUTPATIENT
Start: 2024-01-02 | End: 2024-01-02

## 2024-01-02 NOTE — PROGRESS NOTES
Assessment/Plan:    Post-nasal drip  -acute x2 weeks of sore throat, post nasal drip, cough worse at night  -rapid strep negative in office today    Plan:  Pt has hives reaction to fluticasone, can trial saline nasal spray but advised patient to discontinue if no improvement  Will trial mucinex   Supportive care: honey, steamy showers, tylenol/ibuprofen prn       Diagnoses and all orders for this visit:    Post-nasal drip  -     sodium chloride (OCEAN) 0.65 % nasal spray; 1 spray into each nostril as needed for congestion or rhinitis    Anaphylaxis, subsequent encounter  -     guaiFENesin (MUCINEX) 600 mg 12 hr tablet; Take 2 tablets (1,200 mg total) by mouth every 12 (twelve) hours  -     EPINEPHrine (EpiPen 2-Cem) 0.3 mg/0.3 mL SOAJ; Inject 0.3 mL (0.3 mg total) into a muscle once for 1 dose    Sore throat  -     POCT rapid strepA          Subjective:      Patient ID: Astrid Kennedy is a 38 y.o. female.    Pt presents for 2 weeks of sore throat. States it started the day after she used bathroom cleaning products in her home. Denies fever, chills. States she has had the pain every day and it is worse at night. It is sometimes improved with advil.     Pt presents today because sore throat has still not gone away. No sick family members. No cough or congestion, but pt does endorse post nasal drip.     Pt also requests refill of her epi pen today.        The following portions of the patient's history were reviewed and updated as appropriate: allergies, current medications, past family history, past medical history, past social history, past surgical history, and problem list.    Review of Systems   Constitutional:  Negative for chills and fever.   HENT:  Positive for sore throat. Negative for ear pain.    Eyes:  Negative for pain and visual disturbance.   Respiratory:  Negative for cough and shortness of breath.    Cardiovascular:  Negative for chest pain and palpitations.   Gastrointestinal:  Negative for  "abdominal pain and vomiting.   Genitourinary:  Negative for dysuria and hematuria.   Musculoskeletal:  Negative for arthralgias and back pain.   Skin:  Negative for color change and rash.   Neurological:  Negative for seizures and syncope.   All other systems reviewed and are negative.        Objective:      /75 (BP Location: Left arm, Patient Position: Sitting, Cuff Size: Large)   Pulse 76   Temp 98.3 °F (36.8 °C)   Resp 18   Ht 5' 6\" (1.676 m)   Wt 90.1 kg (198 lb 9.6 oz)   BMI 32.05 kg/m²          Physical Exam  Constitutional:       General: She is not in acute distress.     Appearance: Normal appearance. She is not ill-appearing or toxic-appearing.   HENT:      Right Ear: External ear normal.      Left Ear: External ear normal.      Nose:      Right Turbinates: Swollen.      Left Turbinates: Swollen.      Mouth/Throat:      Mouth: Mucous membranes are moist.      Pharynx: Oropharynx is clear. Uvula midline. No oropharyngeal exudate, posterior oropharyngeal erythema or uvula swelling.      Tonsils: No tonsillar exudate. 0 on the right. 0 on the left.      Comments: Postnasal drip  Eyes:      General: No scleral icterus.     Conjunctiva/sclera: Conjunctivae normal.   Cardiovascular:      Rate and Rhythm: Normal rate and regular rhythm.      Heart sounds: Normal heart sounds. No murmur heard.  Pulmonary:      Effort: Pulmonary effort is normal. No respiratory distress.      Breath sounds: Normal breath sounds. No wheezing, rhonchi or rales.   Abdominal:      General: Bowel sounds are normal.      Palpations: Abdomen is soft.      Tenderness: There is no abdominal tenderness. There is no guarding.   Musculoskeletal:      Right lower leg: No edema.      Left lower leg: No edema.   Skin:     General: Skin is warm and dry.      Coloration: Skin is not jaundiced.   Neurological:      General: No focal deficit present.      Mental Status: She is alert and oriented to person, place, and time.   Psychiatric:   "       Mood and Affect: Mood normal.         Behavior: Behavior normal.

## 2024-01-02 NOTE — ASSESSMENT & PLAN NOTE
-acute x2 weeks of sore throat, post nasal drip, cough worse at night  -rapid strep negative in office today    Plan:  Pt has hives reaction to fluticasone, can trial saline nasal spray but advised patient to discontinue if no improvement  Will trial mucinex   Supportive care: honey, steamy showers, tylenol/ibuprofen prn

## 2024-01-08 ENCOUNTER — OFFICE VISIT (OUTPATIENT)
Dept: FAMILY MEDICINE CLINIC | Facility: CLINIC | Age: 39
End: 2024-01-08

## 2024-01-08 VITALS
SYSTOLIC BLOOD PRESSURE: 96 MMHG | TEMPERATURE: 98.3 F | HEART RATE: 85 BPM | RESPIRATION RATE: 18 BRPM | DIASTOLIC BLOOD PRESSURE: 68 MMHG

## 2024-01-08 DIAGNOSIS — H61.21 IMPACTED CERUMEN OF RIGHT EAR: Primary | ICD-10-CM

## 2024-01-08 PROBLEM — H61.20 IMPACTED CERUMEN: Status: ACTIVE | Noted: 2024-01-08

## 2024-01-08 PROCEDURE — 99213 OFFICE O/P EST LOW 20 MIN: CPT | Performed by: FAMILY MEDICINE

## 2024-01-08 NOTE — ASSESSMENT & PLAN NOTE
Ear shows impacted cerumen, advised to avoid Q-tips.  May try Debrox earwax drops for a week, if symptoms persist we will do irrigation of right ear.

## 2024-01-08 NOTE — PROGRESS NOTES
Name: Astrid Kennedy      : 1985      MRN: [id number]  Encounter Provider: Regina Jo MD  Encounter Date: 2024   Encounter department: [facility]    Assessment & Plan     1. Impacted cerumen of right ear  Assessment & Plan:  Ear shows impacted cerumen, advised to avoid Q-tips.  May try Debrox earwax drops for a week, if symptoms persist we will do irrigation of right ear.    Orders:  -     carbamide peroxide (DEBROX) 6.5 % otic solution; Administer 5 drops to the right ear 2 (two) times a day           Subjective      Astrid presented to office with complaints of headache on right side of her head and ear pressure yesterday, she took ibuprofen and the symptoms resolved.  She wanted to come in because she is going [place] indoor [place] this weekend and wanted to make sure there is no ear infection.  At this time she denies any symptoms.  She was recently sick, but is feeling better now      Review of Systems   Constitutional:  Negative for chills and fever.   HENT:  Positive for ear pain. Negative for congestion.    Respiratory:  Negative for shortness of breath.    Cardiovascular:  Negative for chest pain.   Gastrointestinal:  Negative for diarrhea, nausea and vomiting.   Genitourinary:  Negative for difficulty urinating.   Neurological:  Positive for headaches.       Current Outpatient Medications on File Prior to Visit   Medication Sig    dicyclomine (BENTYL) 10 mg capsule Take 1 capsule (10 mg total) by mouth 3 (three) times a day before meals (Patient not taking: Reported on 2021)    EPINEPHrine (EpiPen 2-Cem) 0.3 mg/0.3 mL SOAJ Inject 0.3 mL (0.3 mg total) into a muscle once for 1 dose    famotidine (PEPCID) 20 mg tablet Take 2 tablets (40 mg total) by mouth daily at bedtime (Patient not taking: Reported on 2021)    guaiFENesin (MUCINEX) 600 mg 12 hr tablet Take 2 tablets (1,200 mg total) by mouth every 12 (twelve) hours (Patient not taking:  Reported on 1/8/2024)    ibuprofen (MOTRIN) 200 mg tablet Take by mouth every 6 (six) hours as needed for mild pain (Patient not taking: Reported on 7/29/2021)    Multiple Vitamin (MULTIVITAMIN) tablet Take 1 tablet by mouth daily. (Patient not taking: Reported on 7/29/2021)    pantoprazole (PROTONIX) 40 mg tablet Take 1 tablet (40 mg total) by mouth daily (Patient not taking: Reported on 7/29/2021)    sodium chloride (OCEAN) 0.65 % nasal spray 1 spray into each nostril as needed for congestion or rhinitis (Patient not taking: Reported on 1/8/2024)    sucralfate (CARAFATE) 1 g tablet Take 1 tablet (1 g total) by mouth 4 (four) times a day for 7 days (Patient not taking: Reported on 10/20/2021)       Objective     BP 96/68 (BP Location: Left arm, Patient Position: Sitting, Cuff Size: Large)   Pulse 85   Temp 98.3 °F (36.8 °C)   Resp 18     Physical Exam  Constitutional:       General: She is not in acute distress.     Appearance: Normal appearance. She is not ill-appearing.   HENT:      Head: Normocephalic and atraumatic.      Right Ear: There is impacted cerumen.      Left Ear: Tympanic membrane and ear canal normal. There is no impacted cerumen.      Mouth/Throat:      Mouth: Mucous membranes are moist.   Eyes:      Extraocular Movements: Extraocular movements intact.      Conjunctiva/sclera: Conjunctivae normal.      Pupils: Pupils are equal, round, and reactive to light.   Cardiovascular:      Rate and Rhythm: Normal rate and regular rhythm.      Heart sounds: No murmur heard.  Pulmonary:      Effort: Pulmonary effort is normal. No respiratory distress.      Breath sounds: Normal breath sounds. No wheezing.   Musculoskeletal:         General: Normal range of motion.   Skin:     General: Skin is warm.   Neurological:      General: No focal deficit present.      Mental Status: She is alert. Mental status is at baseline. She is disoriented.      Cranial Nerves: No cranial nerve deficit.      Sensory: No sensory  deficit.      Motor: No weakness.      Coordination: Coordination normal.      Gait: Gait normal.       Regina Jo MD

## 2024-01-09 ENCOUNTER — OFFICE VISIT (OUTPATIENT)
Dept: FAMILY MEDICINE CLINIC | Facility: CLINIC | Age: 39
End: 2024-01-09

## 2024-01-09 VITALS
TEMPERATURE: 98.3 F | RESPIRATION RATE: 18 BRPM | BODY MASS INDEX: 31.24 KG/M2 | DIASTOLIC BLOOD PRESSURE: 71 MMHG | HEIGHT: 66 IN | SYSTOLIC BLOOD PRESSURE: 104 MMHG | HEART RATE: 75 BPM | OXYGEN SATURATION: 98 % | WEIGHT: 194.4 LBS

## 2024-01-09 DIAGNOSIS — R21 RASH: Primary | ICD-10-CM

## 2024-01-09 PROCEDURE — 99213 OFFICE O/P EST LOW 20 MIN: CPT | Performed by: FAMILY MEDICINE

## 2024-01-09 RX ORDER — CLOTRIMAZOLE AND BETAMETHASONE DIPROPIONATE 10; .64 MG/G; MG/G
CREAM TOPICAL 2 TIMES DAILY
Qty: 45 G | Refills: 1 | Status: SHIPPED | OUTPATIENT
Start: 2024-01-09

## 2024-01-09 RX ORDER — HYDROXYZINE HYDROCHLORIDE 25 MG/1
25 TABLET, FILM COATED ORAL EVERY 6 HOURS PRN
Qty: 30 TABLET | Refills: 1 | Status: SHIPPED | OUTPATIENT
Start: 2024-01-09

## 2024-01-09 NOTE — PROGRESS NOTES
"Assessment/Plan:    Rash  -ddx: viral rash vs allergic rash vs fungal (lower index of suspicion for fungal rash given history, physical exam)    Plan:  Can apply lotrisone cream   Can take atarax prn itching       Diagnoses and all orders for this visit:    Rash  -     clotrimazole-betamethasone (LOTRISONE) 1-0.05 % cream; Apply topically 2 (two) times a day  -     hydrOXYzine HCL (ATARAX) 25 mg tablet; Take 1 tablet (25 mg total) by mouth every 6 (six) hours as needed for itching          Subjective:      Patient ID: Astrid Kennedy is a 38 y.o. female.    Pt presents for 1 week of rash that started inguinal area and has since spread to axilla. Pt states rash is very itchy. Denies new soaps, lotions, detergents or medications.         The following portions of the patient's history were reviewed and updated as appropriate: allergies, current medications, past family history, past medical history, past social history, past surgical history, and problem list.    Review of Systems   Constitutional:  Negative for chills and fever.   HENT:  Negative for ear pain and sore throat.    Eyes:  Negative for pain and visual disturbance.   Respiratory:  Negative for cough and shortness of breath.    Cardiovascular:  Negative for chest pain and palpitations.   Gastrointestinal:  Negative for abdominal pain and vomiting.   Genitourinary:  Negative for dysuria and hematuria.   Musculoskeletal:  Negative for arthralgias and back pain.   Skin:  Positive for rash. Negative for color change.   Neurological:  Negative for seizures and syncope.   All other systems reviewed and are negative.        Objective:      /71 (BP Location: Left arm, Patient Position: Sitting, Cuff Size: Large)   Pulse 75   Temp 98.3 °F (36.8 °C) (Temporal)   Resp 18   Ht 5' 6\" (1.676 m)   Wt 88.2 kg (194 lb 6.4 oz)   LMP 12/19/2023 (Exact Date)   SpO2 98%   BMI 31.38 kg/m²          Physical Exam  Constitutional:       General: She is not in acute " distress.     Appearance: Normal appearance. She is not ill-appearing or toxic-appearing.   HENT:      Right Ear: External ear normal.      Left Ear: External ear normal.      Mouth/Throat:      Mouth: Mucous membranes are moist.      Pharynx: Oropharynx is clear.   Eyes:      General: No scleral icterus.     Conjunctiva/sclera: Conjunctivae normal.   Cardiovascular:      Rate and Rhythm: Normal rate and regular rhythm.      Heart sounds: Normal heart sounds. No murmur heard.  Pulmonary:      Effort: Pulmonary effort is normal. No respiratory distress.      Breath sounds: Normal breath sounds. No wheezing, rhonchi or rales.   Abdominal:      General: Bowel sounds are normal.      Palpations: Abdomen is soft.      Tenderness: There is no abdominal tenderness. There is no guarding.   Musculoskeletal:      Right lower leg: No edema.      Left lower leg: No edema.   Skin:     General: Skin is warm and dry.      Coloration: Skin is not jaundiced.      Findings: Erythema (macular erythematous rash in axilla, under breasts and in inguinal area) and rash present.   Neurological:      General: No focal deficit present.      Mental Status: She is alert and oriented to person, place, and time.   Psychiatric:         Mood and Affect: Mood normal.         Behavior: Behavior normal.

## 2024-01-09 NOTE — ASSESSMENT & PLAN NOTE
-ddx: viral rash vs allergic rash vs fungal (lower index of suspicion for fungal rash given history, physical exam)    Plan:  Can apply lotrisone cream   Can take atarax prn itching

## 2024-02-21 PROBLEM — H61.20 IMPACTED CERUMEN: Status: RESOLVED | Noted: 2024-01-08 | Resolved: 2024-02-21

## 2024-02-28 ENCOUNTER — OFFICE VISIT (OUTPATIENT)
Dept: FAMILY MEDICINE CLINIC | Facility: CLINIC | Age: 39
End: 2024-02-28

## 2024-02-28 VITALS
SYSTOLIC BLOOD PRESSURE: 96 MMHG | OXYGEN SATURATION: 98 % | TEMPERATURE: 98.4 F | HEART RATE: 114 BPM | DIASTOLIC BLOOD PRESSURE: 67 MMHG | WEIGHT: 195 LBS | BODY MASS INDEX: 31.47 KG/M2

## 2024-02-28 DIAGNOSIS — J06.9 VIRAL UPPER RESPIRATORY TRACT INFECTION: Primary | ICD-10-CM

## 2024-02-28 PROCEDURE — 87636 SARSCOV2 & INF A&B AMP PRB: CPT | Performed by: INTERNAL MEDICINE

## 2024-02-28 PROCEDURE — 99213 OFFICE O/P EST LOW 20 MIN: CPT | Performed by: FAMILY MEDICINE

## 2024-02-28 RX ORDER — BENZONATATE 100 MG/1
100 CAPSULE ORAL 3 TIMES DAILY PRN
Qty: 20 CAPSULE | Refills: 0 | Status: SHIPPED | OUTPATIENT
Start: 2024-02-28

## 2024-02-28 NOTE — LETTER
February 28, 2024     Patient: Astrid Kennedy  YOB: 1985  Date of Visit: 2/28/2024      To Whom it May Concern:    Astrid Kennedy is under my professional care. Astrid was seen in my office on 2/28/2024. Astrid may return to work on 3/04/2024 .    If you have any questions or concerns, please don't hesitate to call.         Sincerely,          Stevo Rosales MD        CC: No Recipients

## 2024-02-28 NOTE — PROGRESS NOTES
Assessment/Plan:     38-year-old female presenting with upper respiratory tract infection symptoms, likely viral in etiology.  Fortunately patient does not have any significant systemic signs at this time.  She is noted for softer blood pressures and tachycardia, however she states that her blood pressures tend to run on the lower side.  She denies any current cardiopulmonary symptoms, however does endorse generalized bodyaches and postnasal drip, which we will treat symptomatically at this time.  Patient is encouraged to maintain adequate hydration.  We will additionally obtain COVID-19 and influenza PCR.  Patient advised to continue to maintain appropriate interventions to limit the spread of infection.     Diagnoses and all orders for this visit:    Viral upper respiratory tract infection  -     Covid19 and INFLUENZA A/B PCR  -     benzonatate (TESSALON PERLES) 100 mg capsule; Take 1 capsule (100 mg total) by mouth 3 (three) times a day as needed for cough          Subjective:     Patient ID: Astrid Kennedy is a 38 y.o. female.    Patient is a 38-year-old female, who presents with symptoms of URI since Saturday.  Patient states that he has been experiencing generalized bodyaches, congestion, postnasal drip and recently started experiencing sore throat.  Last night she took a body temperature and recorded at 102 Fahrenheit.  She did at home test  for COVID-19 which was negative.  She is unsure as to any specific sick contacts, however she works as a , states that multiple students have been ill.  She denies any chest pain, palpitations or acute shortness of breath at this time.    Review of Systems   Constitutional:  Positive for chills and fever.   HENT:  Positive for congestion and sore throat. Negative for trouble swallowing.    Respiratory:  Positive for cough. Negative for shortness of breath and wheezing.    Cardiovascular:  Negative for chest pain and palpitations.   Gastrointestinal:   Positive for nausea. Negative for abdominal pain, constipation, diarrhea and vomiting.   Genitourinary:  Negative for difficulty urinating.   Musculoskeletal:  Positive for arthralgias and myalgias.   Neurological:  Negative for dizziness and light-headedness.   Psychiatric/Behavioral:  Negative for agitation and behavioral problems.        Objective:     Physical Exam  Vitals reviewed.   Constitutional:       Appearance: Normal appearance. She is obese.   HENT:      Head: Normocephalic and atraumatic.      Mouth/Throat:      Mouth: Mucous membranes are moist.      Pharynx: No oropharyngeal exudate or posterior oropharyngeal erythema.   Eyes:      General:         Right eye: No discharge.         Left eye: No discharge.      Conjunctiva/sclera: Conjunctivae normal.   Cardiovascular:      Rate and Rhythm: Normal rate and regular rhythm.      Heart sounds: S1 normal and S2 normal.   Pulmonary:      Effort: Pulmonary effort is normal.      Breath sounds: Normal breath sounds. No wheezing, rhonchi or rales.   Abdominal:      Palpations: Abdomen is soft.   Musculoskeletal:         General: No swelling or tenderness.      Cervical back: Neck supple.   Skin:     General: Skin is warm and dry.   Neurological:      General: No focal deficit present.      Mental Status: She is alert.   Psychiatric:         Mood and Affect: Mood normal.         Behavior: Behavior normal.         URI symptoms

## 2024-02-29 LAB
FLUAV RNA RESP QL NAA+PROBE: NEGATIVE
FLUBV RNA RESP QL NAA+PROBE: POSITIVE
SARS-COV-2 RNA RESP QL NAA+PROBE: NEGATIVE

## 2024-04-28 PROBLEM — J06.9 VIRAL UPPER RESPIRATORY TRACT INFECTION: Status: RESOLVED | Noted: 2024-02-28 | Resolved: 2024-04-28

## 2024-05-01 NOTE — PROGRESS NOTES
There are no diagnoses linked to this encounter.      Reviewed perineal hygiene and products to avoid.  Reviewed medications and usage instructions   Call if no symptom improvement, all questions answered, return for annual.     Astrid Kennedy is a 38 y.o. female here for vaginal complaints. She reports   Denies any treatments tried.   Denies recent antibiotic use. Denies douching.   Denies fever, pelvic pain or dyspareunia. Denies new sexual partners.  There were no vitals filed for this visit.  There is no height or weight on file to calculate BMI.  Allergies   Allergen Reactions    Peanut-Containing Drug Products - Food Allergy Anaphylaxis    Treenut [Nuts - Food Allergy] Anaphylaxis    Advair Diskus [Fluticasone-Salmeterol] Hives    Shellfish-Derived Products - Food Allergy     Tylenol [Acetaminophen]        Patient Active Problem List    Diagnosis Date Noted    Rash 01/09/2024    Post-nasal drip 01/02/2024    Influenza vaccination declined 10/20/2021    Fatigue 10/20/2021    Epigastric abdominal pain 10/09/2020    Acute bilateral low back pain without sciatica 08/01/2018    BMI 32.0-32.9,adult 07/20/2018    Annual physical exam 07/20/2018    Anaphylactic syndrome 07/20/2018     Past Medical History:   Diagnosis Date    Abnormal Pap smear of cervix     2019,2020    Asthma     Disease of thyroid gland     enlarged thyroid    HPV (human papilloma virus) infection     Varicella     Visual impairment     contacts in     Past Surgical History:   Procedure Laterality Date    TONSILECTOMY AND ADNOIDECTOMY      WISDOM TOOTH EXTRACTION       Social History     Tobacco Use    Smoking status: Never    Smokeless tobacco: Never   Vaping Use    Vaping status: Never Used   Substance Use Topics    Alcohol use: No    Drug use: Never       Current Outpatient Medications:     benzonatate (TESSALON PERLES) 100 mg capsule, Take 1 capsule (100 mg total) by mouth 3 (three) times a day as needed for cough, Disp: 20 capsule, Rfl:  0      OB History    Para Term  AB Living   2 2 2   0 2   SAB IAB Ectopic Multiple Live Births         0 2      # Outcome Date GA Lbr Chiki/2nd Weight Sex Delivery Anes PTL Lv   2 Term 16 40w0d / 00:49 3232 g (7 lb 2 oz) F Vag-Spont Local N JOSE   1 Term 11 40w0d  3147 g (6 lb 15 oz) M Vag-Spont   JOSE      Obstetric Comments   Menarche 12       Review of Systems   Constitutional: Negative for chills, fatigue, fever and unexpected weight change.   Respiratory: Negative for shortness of breath.    Gastrointestinal: Negative abdominal pain, constipation and diarrhea.   Genitourinary: Negative for difficulty urinating, dysuria and hematuria.     Physical Exam   Constitutional: Appears well-developed and well-nourished. No distress. Alert and oriented.  HENT: Atraumatic, Normocephalic.  Conjunctivae clear  Neck: Normal range of motion.   Pulmonary: Effort normal.  Abdominal: Soft. Negative for pain, tenderness or mass  Musculoskeletal: Normal ROM  Skin: Warm & Dry  Psychological: Normal mood, thought content, behavior & judgement       Pelvic exam was performed with patient supine, lithotomy position.      Labia: Right: Negative rash, tenderness, lesion or injury               Left: Negative rash, tenderness, lesion or injury   Urethral meatus:  Negative for  tenderness, inflammation or discharge.   Uterus: not deviated, enlarged, fixed or tender.   Cervix: No CMT, no discharge or friability.   Right adnexa: no mass, no tenderness and no fullness.  Left adnexa: no mass, no tenderness and no fullness.   Vagina: No erythema, tenderness, masses, or foreign body in the vagina. No signs of injury around the vagina. No unusual vaginal discharge   Perineum without lesions, signs of injury, erythema or swelling.  Inguinal Canal:        Right: No inguinal adenopathy or hernia present.        Left: No inguinal adenopathy or hernia present.     OBGyn Exam

## 2024-05-02 ENCOUNTER — OFFICE VISIT (OUTPATIENT)
Dept: OBGYN CLINIC | Facility: CLINIC | Age: 39
End: 2024-05-02
Payer: COMMERCIAL

## 2024-05-02 VITALS
HEIGHT: 66 IN | BODY MASS INDEX: 31.82 KG/M2 | DIASTOLIC BLOOD PRESSURE: 68 MMHG | SYSTOLIC BLOOD PRESSURE: 120 MMHG | WEIGHT: 198 LBS

## 2024-05-02 DIAGNOSIS — Z01.419 ENCOUNTER FOR GYNECOLOGICAL EXAMINATION WITHOUT ABNORMAL FINDING: Primary | ICD-10-CM

## 2024-05-02 PROCEDURE — G0145 SCR C/V CYTO,THINLAYER,RESCR: HCPCS | Performed by: OBSTETRICS & GYNECOLOGY

## 2024-05-02 PROCEDURE — S0612 ANNUAL GYNECOLOGICAL EXAMINA: HCPCS | Performed by: OBSTETRICS & GYNECOLOGY

## 2024-05-02 PROCEDURE — G0476 HPV COMBO ASSAY CA SCREEN: HCPCS | Performed by: OBSTETRICS & GYNECOLOGY

## 2024-05-02 RX ORDER — CLOBETASOL PROPIONATE 0.5 MG/G
CREAM TOPICAL
COMMUNITY
Start: 2024-04-16

## 2024-05-02 RX ORDER — SPIRONOLACTONE 50 MG/1
TABLET, FILM COATED ORAL
COMMUNITY
Start: 2024-04-16

## 2024-05-02 RX ORDER — CLINDAMYCIN PHOSPHATE 10 UG/ML
LOTION TOPICAL EVERY MORNING
COMMUNITY
Start: 2024-04-17

## 2024-05-02 NOTE — PATIENT INSTRUCTIONS
Dermatitis   AMBULATORY CARE:   Dermatitis  is skin inflammation. Dermatitis may be caused by allergens such as dust mites, pet dander, pollen, and certain foods. Dermatitis can also develop when something touches your skin and irritates it or causes an allergic reaction. Examples include soaps, chemicals, latex, and poison ivy.  Signs and symptoms of dermatitis  depend on the cause:  An itchy rash    Redness    Bumps or blisters that crust over or ooze clear fluid    Swelling    Call your local emergency number (911 in the [place]) or have someone call if:   You have symptoms of anaphylaxis, such as sudden trouble breathing, throat swelling, or feeling dizzy or lightheaded.      Seek care immediately if:   You develop a fever or have red streaks going up your arm or leg.    Your rash gets more swollen, red, or hot.    Call your doctor or dermatologist if:   Your skin blisters, oozes white or yellow pus, or has a foul-smelling discharge.    Your rash spreads or does not get better, even after treatment.    You have questions or concerns about your condition or care.    Treatment for dermatitis  depends on the cause of your rash. You may need medicines to help decrease itching and inflammation or treat a bacterial infection. They may be given as a topical cream, shot, or a pill.  Manage dermatitis:   Apply a cool compress to your rash.  This will help soothe your skin.    Apply lotions or creams to the area.  These help keep your skin moist and decrease itching. Apply the lotion or cream right after a lukewarm bath or shower when your skin is still damp. Use products that do not contain dye or a scent.    Avoid skin irritants.  Examples include makeup, hair products, soaps, and cleansers. Use products that do not contain a scent or dye.    Follow up with your doctor or dermatologist as directed:  Write down your questions so you remember to ask them during your visits.  © Copyright [employer] 2023 Information is for End  User's use only and may not be sold, redistributed or otherwise used for commercial purposes.  The above information is an  only. It is not intended as medical advice for individual conditions or treatments. Talk to your doctor, nurse or pharmacist before following any medical regimen to see if it is safe and effective for you.  Breast Self Exam for Women   AMBULATORY CARE:   A breast self-exam (BSE)  is a way to check your breasts for lumps and other changes. Regular BSEs can help you know how your breasts normally look and feel. Most breast lumps or changes are not cancer, but you should always have them checked by a healthcare provider.  Why you should do a BSE:  Breast cancer is the most common type of cancer in women. Even if you have mammograms, you may still want to do a BSE regularly. If you know how your breasts normally feel and look, it may help you know when to contact your healthcare provider. Mammograms can miss some cancers. You may find a lump during a BSE that did not show up on a mammogram.  When you should do a BSE:  If you have periods, you may want to do your BSE 1 week after your period ends. This is the time when your breasts may be the least swollen, lumpy, or tender. You can do regular BSEs even if you are breastfeeding or have breast implants.  Call your doctor if:   You find any lumps or changes in your breasts.    You have breast pain or fluid coming from your nipples.    You have questions or concerns about your condition or care.    How to do a BSE:       Look at your breasts in a mirror.  Look at the size and shape of each breast and nipple. Check for swelling, lumps, dimpling, scaly skin, or other skin changes. Look for nipple changes, such as a nipple that is painful or beginning to pull inward. Gently squeeze both nipples and check to see if fluid (that is not breast milk) comes out of them. If you find any of these or other breast changes, contact your healthcare  provider. Check your breasts while you sit or  the following 3 positions:    Hang your arms down at your sides.    Raise your hands and join them behind your head.    Put firm pressure with your hands on your hips. Bend slightly forward while you look at your breasts in the mirror.    Lie down and feel your breasts.  When you lie down, your breast tissue spreads out evenly over your chest. This makes it easier for you to feel for lumps and anything that may not be normal for your breasts. Do a BSE on one breast at a time.    Place a small pillow or towel under your left shoulder.  Put your left arm behind your head.    Use the 3 middle fingers of your right hand.  Use your fingertip pads, on the top of your fingers. Your fingertip pad is the most sensitive part of your finger.    Use small circles to feel your breast tissue.  Use your fingertip pads to make dime-sized, overlapping circles on your breast and armpits. Use light, medium, and firm pressure. First, press lightly. Second, press with medium pressure to feel a little deeper into the breast. Last, use firm pressure to feel deep within your breast.    Examine your entire breast area.  Examine the breast area from above the breast to below the breast where you feel only ribs. Make small circles with your fingertips, starting in the middle of your armpit. Make circles going up and down the breast area. Continue toward your breast and all the way across it. Examine the area from your armpit all the way over to the middle of your chest (breastbone). Stop at the middle of your chest.    Move the pillow or towel to your right shoulder, and put your right arm behind your head.  Use the 3 fingertip pads of your left hand, and repeat the above steps to do a BSE on your right breast.  What else you can do to check for breast problems or cancer:  Talk to your healthcare provider about mammograms. A mammogram is an x-ray of your breasts to screen for breast  cancer or other problems. Your provider can tell you the benefits and risks of mammograms. The first mammogram is usually at age 45 or 50. Your provider may recommend you start at 40 or younger if your risk for breast cancer is high. Mammograms usually continue every 1 to 2 years until age 74.       Follow up with your doctor as directed:  Write down your questions so you remember to ask them during your visits.  © Copyright [employer] 2023 Information is for End User's use only and may not be sold, redistributed or otherwise used for commercial purposes.  The above information is an  only. It is not intended as medical advice for individual conditions or treatments. Talk to your doctor, nurse or pharmacist before following any medical regimen to see if it is safe and effective for you.  Wellness Visit for Adults   AMBULATORY CARE:   A wellness visit  is when you see your healthcare provider to get screened for health problems. Your healthcare provider will also give you advice on how to stay healthy. Write down your questions so you remember to ask them. Ask your healthcare provider how often you should have a wellness visit.  What happens at a wellness visit:  Your healthcare provider will ask about your health, and your family history of health problems. This includes high blood pressure, heart disease, and cancer. He or she will ask if you have symptoms that concern you, if you smoke, and about your mood. You may also be asked about your intake of medicines, supplements, food, and alcohol. Any of the following may be done:  Your weight  will be checked. Your height may also be checked so your body mass index (BMI) can be calculated. Your BMI shows if you are at a healthy weight.    Your blood pressure  and heart rate will be checked. Your temperature may also be checked.    Blood and urine tests  may be done. Blood tests may be done to check your cholesterol levels. Abnormal cholesterol levels  increase your risk for heart disease and stroke. You may also need a blood or urine test to check for diabetes if you are at increased risk. Urine tests may be done to look for signs of an infection or kidney disease.    A physical exam  includes checking your heartbeat and lungs with a stethoscope. Your healthcare provider may also check your skin to look for sun damage.    Screening tests  may be recommended. A screening test is done to check for diseases that may not cause symptoms. The screening tests you may need depend on your age, gender, family history, and lifestyle habits. For example, colorectal screening may be recommended if you are 50 years old or older.    Screening tests you need if you are a woman:   A Pap smear  is used to screen for cervical cancer. Pap smears are usually done every 3 to 5 years depending on your age. You may need them more often if you have had abnormal Pap smear test results in the past. Ask your healthcare provider how often you should have a Pap smear.    A mammogram  is an x-ray of your breasts to screen for breast cancer. Experts recommend mammograms every 2 years starting at age 50 years. You may need a mammogram at age 49 years or younger if you have an increased risk for breast cancer. Talk to your healthcare provider about when you should start having mammograms and how often you need them.    Vaccines you may need:   Get an influenza vaccine  every year. The influenza vaccine protects you from the flu. Several types of viruses cause the flu. The viruses change over time, so new vaccines are made each year.    Get a tetanus-diphtheria (Td) booster vaccine  every 10 years. This vaccine protects you against tetanus and diphtheria. Tetanus is a severe infection that may cause painful muscle spasms and lockjaw. Diphtheria is a severe bacterial infection that causes a thick covering in the back of your mouth and throat.    Get a human papillomavirus (HPV) vaccine  if you are  female and aged 19 to 26 or male 19 to 21 and never received it. This vaccine protects you from HPV infection. HPV is the most common infection spread by sexual contact. HPV may also cause vaginal, penile, and anal cancers.    Get a pneumococcal vaccine  if you are aged 65 years or older. The pneumococcal vaccine is an injection given to protect you from pneumococcal disease. Pneumococcal disease is an infection caused by pneumococcal bacteria. The infection may cause pneumonia, meningitis, or an ear infection.    Get a shingles vaccine  if you are 60 or older, even if you have had shingles before. The shingles vaccine is an injection to protect you from the varicella-zoster virus. This is the same virus that causes chickenpox. Shingles is a painful rash that develops in people who had chickenpox or have been exposed to the virus.    How to eat healthy:  My Plate is a model for planning healthy meals. It shows the types and amounts of foods that should go on your plate. Fruits and vegetables make up about half of your plate, and grains and protein make up the other half. A serving of dairy is included on the side of your plate. The amount of calories and serving sizes you need depends on your age, gender, weight, and height. Examples of healthy foods are listed below:  Eat a variety of vegetables  such as dark green, red, and orange vegetables. You can also include canned vegetables low in sodium (salt) and frozen vegetables without added butter or sauces.    Eat a variety of fresh fruits , canned fruit in 100% juice, frozen fruit, and dried fruit.    Include whole grains.  At least half of the grains you eat should be whole grains. Examples include whole-wheat bread, wheat pasta, brown rice, and whole-grain cereals such as oatmeal.    Eat a variety of protein foods such as seafood (fish and shellfish), lean meat, and poultry without skin (turkey and chicken). Examples of lean meats include pork leg, shoulder, or  tenderloin, and beef round, sirloin, tenderloin, and extra lean ground beef. Other protein foods include eggs and egg substitutes, beans, peas, soy products, nuts, and seeds.    Choose low-fat dairy products such as skim or 1% milk or low-fat yogurt, cheese, and cottage cheese.    Limit unhealthy fats  such as butter, hard margarine, and shortening.       Exercise:  Exercise at least 30 minutes per day on most days of the week. Some examples of exercise include walking, biking, dancing, and swimming. You can also fit in more physical activity by taking the stairs instead of the elevator or parking farther away from stores. Include muscle strengthening activities 2 days each week. Regular exercise provides many health benefits. It helps you manage your weight, and decreases your risk for type 2 diabetes, heart disease, stroke, and high blood pressure. Exercise can also help improve your mood. Ask your healthcare provider about the best exercise plan for you.       General health and safety guidelines:   Do not smoke.  Nicotine and other chemicals in cigarettes and cigars can cause lung damage. Ask your healthcare provider for information if you currently smoke and need help to quit. E-cigarettes or smokeless tobacco still contain nicotine. Talk to your healthcare provider before you use these products.    Limit alcohol.  A drink of alcohol is 12 ounces of beer, 5 ounces of wine, or 1½ ounces of liquor.    Lose weight, if needed.  Being overweight increases your risk of certain health conditions. These include heart disease, high blood pressure, type 2 diabetes, and certain types of cancer.    Protect your skin.  Do not sunbathe or use tanning beds. Use sunscreen with a SPF 15 or higher. Apply sunscreen at least 15 minutes before you go outside. Reapply sunscreen every 2 hours. Wear protective clothing, hats, and sunglasses when you are outside.    Drive safely.  Always wear your seatbelt. Make sure everyone in your  car wears a seatbelt. A seatbelt can save your life if you are in an accident. Do not use your cell phone when you are driving. This could distract you and cause an accident. Pull over if you need to make a call or send a text message.    Practice safe sex.  Use latex condoms if are sexually active and have more than one partner. Your healthcare provider may recommend screening tests for sexually transmitted infections (STIs).    Wear helmets, lifejackets, and protective gear.  Always wear a helmet when you ride a bike or motorcycle, go skiing, or play sports that could cause a head injury. Wear protective equipment when you play sports. Wear a lifejacket when you are on a boat or doing water sports.    © Copyright [employer] 2023 Information is for End User's use only and may not be sold, redistributed or otherwise used for commercial purposes.  The above information is an  only. It is not intended as medical advice for individual conditions or treatments. Talk to your doctor, nurse or pharmacist before following any medical regimen to see if it is safe and effective for you.  HPV (Human Papillomavirus)   AMBULATORY CARE:   Human Papillomavirus (HPV)  is the name for a group of viruses that can infect your skin or other parts of your body. HPV is the most common infection spread by sexual contact. It can also be spread from a mother to her baby during delivery.  Common symptoms include the following:   Painless warts in your mouth or on your genitals    Genital or anal discharge, bleeding, itching, or pain    Pain when you urinate    Call your doctor if:   You have new or worsening symptoms.    You have questions or concerns about your condition or care.    HPV diagnosis:  Your healthcare provider may use a vinegar liquid to help diagnose HPV genital warts. Women 30 to 65 years old can be checked for HPV during regular cervical cancer screenings. An HPV test checks for certain types of HPV that can  cause changes in cervical cells. Without treatment, the changed cells can become cancer. An HPV test can be done every 5 years if the results show no infection. The test can be done with or without a Pap smear. A Pap smear checks for cancer or for abnormal cells that can become cancer. You may be tested for HPV if you have mouth or throat cancer.  Treatment:  HPV cannot be cured, but an infection may go away on its own in about 2 years without causing problems. If the infection continues, some types of HPV can lead to health conditions that need to be treated. Examples include warts and certain cancers, especially squamous cell carcinoma (SCC). HPV-linked SCCs commonly develop in the anus, throat (called oropharyngeal cancer), cervix, vagina, penis, or mouth. HPV can also cause a type of cervical cancer called adenocarcinoma. Symptoms of any of these conditions may not develop for several years after you were exposed to HPV. You will need to be monitored closely. Ask your healthcare provider for more information about monitoring, conditions caused by HPV, and available treatments.  Prevent an HPV infection:  HPV is usually spread through sexual activity. The following can help prevent infection:  Ask about the HPV vaccine.  The HPV vaccine is given to females and males, usually at 11 or 12 years of age. It can be given from 9 years through 45 years of age, if needed. It is most effective if given before sexual activity begins.         Use a new condom, contraceptive barrier, or dental dam each time you have sex.  This includes oral, vaginal, and anal sex. Talk to your healthcare provider if you have any questions about what to use or how to use it.    Follow up with your doctor as directed:  Write down your questions so you remember to ask them during your visits.  © Copyright [employer] 2023 Information is for End User's use only and may not be sold, redistributed or otherwise used for commercial purposes.  The above  information is an  only. It is not intended as medical advice for individual conditions or treatments. Talk to your doctor, nurse or pharmacist before following any medical regimen to see if it is safe and effective for you.  Kegel Exercises for Women   AMBULATORY CARE:   Kegel exercises  help strengthen your pelvic muscles. Pelvic muscles hold your pelvic organs, such as your bladder and uterus, in place. Kegel exercises help prevent or control certain conditions, such as urine incontinence (leakage) or uterine prolapse.       Call your doctor or physical therapist if:   You cannot feel your muscles tighten or relax.    You continue to leak urine.    You have questions or concerns about your condition or care.    Use the correct muscles:  Pelvic muscles are the muscles you use to control urine flow. To target these muscles, stop and start the flow of urine several times. This will help you become familiar with how it feels to tighten and relax these muscles.  How to do Kegel exercises:   Get into a comfortable position.  You may lie down, stand up, or sit down to do these exercises. When you first try to do these exercises, it may be easier if you lie down.    Tighten or squeeze your pelvic muscles slowly.  It may feel like you are trying to hold back urine or gas. Hold this position for 3 seconds. Relax for 3 seconds. Repeat this cycle 10 times. Do not hold your breath when you do Kegel exercises. Keep your stomach, back, and leg muscles relaxed.    Do 10 sets of Kegel exercises, at least 3 times a day.  When you know how to do Kegel exercises, use different positions. This will help to strengthen your pelvic muscles as much as possible. You can do these exercises while you lie on the floor, watch TV, or while you stand. Tighten your pelvic muscles before you sneeze, cough, or lift to prevent urine leakage. You may notice improved bladder control within about 6 weeks.    Follow up with your doctor  or physical therapist as directed:  Write down your questions so you remember to ask them during your visits.  © Copyright [employer] 2023 Information is for End User's use only and may not be sold, redistributed or otherwise used for commercial purposes.  The above information is an  only. It is not intended as medical advice for individual conditions or treatments. Talk to your doctor, nurse or pharmacist before following any medical regimen to see if it is safe and effective for you.       Perineal Hygiene      Your vaginal naturally takes care of its self, it is a self washing system, the less you mess the healthier it will be     No soaps or feminine wash to the vulva, these products can cause dermitis, bacterial infections and other vulvar problems.   Use only water to cleanse, or water with Dove or Dove Sensitive Skin Bar soap if necessary.    No scented lotions or products are advised in or near your vulva.    Use only coconut oil for moisture if needed.  No douching this may cause imbalance in your vaginal PH and further issues.    If you wear panty liners, you may apply a thin coating of Vaseline, A&D ointment or coconut oil to the vulvar tissues as a skin barrier     Cotton underware, loose fitting clothing  Only perfume-free, dye-free laundry detergent, use a second rinse cycle   Avoid fabric softeners/dryer sheets.       Your partner should avoid the same products as well.       Over the counter probiotic to restore vaginal jin may be helpful as well, take daily.       You may also look into Boric Acid vaginal suppositories to restore vaginal PH balance for up to 2 weeks as directed on the box. You may not use these if you are pregnant      For vaginal dryness:      You may use:     Coconut oil (organic, pure, unscented) as needed for moisture or lubrication. ( Do not use if allergic)       Replens moisture restore external comfort gel daily ( use as directed on the box)        Replens  long lasting vaginal moisturizer  ( use as directed on the box)         For Vaginal Lubrication:          You may use:     Coconut oil (organic, pure, unscented) as a lubricant or another scent-free lubricant (Astroglide, Uberlube) if needed.  Do not use coconut oil or silicone if using a condom as this may break down the integrity of the condom and cause an unplanned pregnancy              Do not use coconut oil if allergic               Replens silky smooth lubricant, premium silicone based lubricant for intercourse. ( use as directed, a small amount will provide an enhanced natural feeling)     Any premium over the counter vaginal lubricant water or silicone based. Silicone based will have more staying power.

## 2024-05-02 NOTE — PROGRESS NOTES
Diagnoses and all orders for this visit:    Encounter for gynecological examination without abnormal finding  -     Liquid-based pap, screening    Other orders  -     spironolactone (ALDACTONE) 50 mg tablet; TAKE 2 TABLETS IN THE MORNING AND 1 TABLET IN THE EVENING  -     clindamycin (CLEOCIN T) 1 % lotion; Apply topically every morning Apply to affected area  -     clobetasol (TEMOVATE) 0.05 % cream; APPLY TWICE DAILY TO RASH FOR 4-5 WEEKS  -     tretinoin (RETIN-A) 0.025 % cream; APPLY SPARINGLY TO FACE NIGHTLY        Perineal hygiene reviewed   Weight bearing exercises minium of 150 mins/weekly advised.   Kegel exercises recommended daily, see AVS for instructions and recommendations  SBE encouraged, ASCCP guidelines reviewed. Condoms encouraged with all sexual activity to prevent STI's.   Gardisil vaccines recommended up to age 45  Calcium/ Vit D dietary requirements discussed,   Advised to call with any issues,  all concerns & questions addressed.   See after visit summary for further information and recommendations to the above mentioned subjects which we may or may not have covered in detail during your visit     F/U Annually and PRN      Health Maintenance:    Last PAP: 2021  Negative  HPV Negative   19 ASCUS + HR HPV 18  19 normal colp  20 ASCUS, Atypical glandular + HR HPV 18  10/12/20 Normal colp and benign EBX    Next PAP Due:collected today     Last Mammogram: Not on file  advised age 42     Last Colonoscopy: Not on file    advised age 47    Gardisil:  Not completed Gardasil 9 was advised for prevention of HPV-related disease. We discussed risks/benefits, SE's/AE's at length and all questions were answered. Written info was provided for review. The patient agrees to consider and is aware she may call to schedule injection #1 at any time.     Subjective    CC: Yearly Exam      Astrid Kennedy is a 38 y.o. female here for an annual exam.   GYN hx includes:  2 vaginal deliveries    No personal Hx of breast, cervical, ovarian or colon CA.   Family hx of: PGF- colon cancer , PGM-Breast cancer   Medically stable, reports no changes in medical Hx, follows with PMD    Patient's last menstrual period was 04/11/2024 (exact date).  Her menstrual cycles are regular every 28-30 days. She denies issues with bleeding during her menses.   Reports history of abnormal pap smear.  She denies breast concerns, abnormal vaginal discharge,  odor, irritation, bowel/bladder dysfunction, urinary symptoms, pelvic pain, or dyspareunia today.  Mentions a random occurring vulvar rash, raised flesh colored, itchy, she used steroid cream on it with resolve, no pustules or pain with the rash. She has had other random skin rashes (arm & neck)recently which is concerning to her. Advised she can use Lotrisone ointment or OTC hydrocortisone 1 % , 2 times daily for 7-14 days if rash reoccurs. We reviewed product use, she uses Dove white bar soap and hypoallergenic detergents etc. She has not changed anything that she normally uses.   She is sexually active. Monogamous relationship.    Her current method of contraception includes male vasectomy . Denies any issues with her BCM.   She does not want STD testing today.  Denies intimate partner violence    Children 13, 8   Teacher at [employer]       Past Medical History:   Diagnosis Date    Abnormal Pap smear of cervix     2019,2020    Asthma     Disease of thyroid gland     enlarged thyroid    HPV (human papilloma virus) infection     Varicella     Visual impairment     contacts in     Past Surgical History:   Procedure Laterality Date    TONSILECTOMY AND ADNOIDECTOMY      WISDOM TOOTH EXTRACTION         Immunization History   Administered Date(s) Administered    DTaP 02/25/2016    Tdap 02/25/2016       Family History   Problem Relation Age of Onset    Diabetes Mother     Hypertension Mother     Pulmonary embolism Mother     Deep vein thrombosis Mother     Hypertension  Father     Prostate cancer Father     No Known Problems Sister     No Known Problems Maternal Grandmother     No Known Problems Maternal Grandfather     Breast cancer Paternal Grandmother     Prostate cancer Paternal Grandfather     Colon cancer Paternal Grandfather         pt unsure if prostate or colon CA     No Known Problems Daughter     No Known Problems Son     Ovarian cancer Neg Hx      Social History     Tobacco Use    Smoking status: Never    Smokeless tobacco: Never   Vaping Use    Vaping status: Never Used   Substance Use Topics    Alcohol use: No    Drug use: Never       Current Outpatient Medications:     clindamycin (CLEOCIN T) 1 % lotion, Apply topically every morning Apply to affected area, Disp: , Rfl:     clobetasol (TEMOVATE) 0.05 % cream, APPLY TWICE DAILY TO RASH FOR 4-5 WEEKS, Disp: , Rfl:     spironolactone (ALDACTONE) 50 mg tablet, TAKE 2 TABLETS IN THE MORNING AND 1 TABLET IN THE EVENING, Disp: , Rfl:     tretinoin (RETIN-A) 0.025 % cream, APPLY SPARINGLY TO FACE NIGHTLY, Disp: , Rfl:   Patient Active Problem List    Diagnosis Date Noted    Rash 2024    Post-nasal drip 2024    Influenza vaccination declined 10/20/2021    Fatigue 10/20/2021    Epigastric abdominal pain 10/09/2020    Acute bilateral low back pain without sciatica 2018    BMI 32.0-32.9,adult 2018    Annual physical exam 2018    Anaphylactic syndrome 2018       Allergies   Allergen Reactions    Peanut-Containing Drug Products - Food Allergy Anaphylaxis    Treenut [Nuts - Food Allergy] Anaphylaxis    Advair Diskus [Fluticasone-Salmeterol] Hives    Shellfish-Derived Products - Food Allergy     Tylenol [Acetaminophen]        OB History    Para Term  AB Living   2 2 2   0 2   SAB IAB Ectopic Multiple Live Births         0 2      # Outcome Date GA Lbr Chiki/2nd Weight Sex Delivery Anes PTL Lv   2 Term 16 40w0d / 00:49 3232 g (7 lb 2 oz) F Vag-Spont Local N JOSE   1 Term 11  "40w0d  3147 g (6 lb 15 oz) M Vag-Spont   JOSE      Obstetric Comments   Menarche 12   2 vaginal deliveries        Vitals:    05/02/24 0735   BP: 120/68   BP Location: Left arm   Patient Position: Sitting   Cuff Size: Large   Weight: 89.8 kg (198 lb)   Height: 5' 6\" (1.676 m)     Body mass index is 31.96 kg/m².    Review of Systems     Constitutional: Negative for chills, fatigue, fever, headaches, visual disturbances, and unexpected weight change.   Respiratory: Negative for cough, & shortness of breath.  Cardiovascular: Negative for chest pain. .    Gastrointestinal: Negative for Abd pain, nausea & vomiting, constipation and diarrhea.   Genitourinary: Negative for difficulty urinating, dysuria, hematuria, unusual vaginal bleeding or discharge  Skin: Negative skin changes    Physical Exam     Constitutional: Alert & Oriented x3, well-developed and well-nourished. No distress.   HENT: Atraumatic, Normocephalic, Conjunctivae clear  Neck: Normal range of motion. Neck supple. No thyromegaly, mass, nodules or tenderness  Pulmonary: Effort normal.   Abdominal: Soft. No tenderness or masses  Musculoskeletal: Normal ROM  Skin: Warm & Dry  Psychological: Normal mood, thought content, behavior & judgement     Breasts:   Right: tissue soft without masses, tenderness, skin changes or nipple discharge. No areas of erythema or pain. No subclavicular, axillary, pectoral adenopathy  Left:  tissue soft without masses, tenderness, skin changes or nipple discharge. No areas of erythema or pain. No subclavicular, axillary, pectoral adenopathy    Pelvic exam was performed with patient supine, lithotomy position.      Labia: Negative rash, tenderness, lesion or injury on the right labia.              Negative rash, tenderness, lesion or injury on the left labia.   Urethral meatus:  Negative for  tenderness, inflammation or discharge.   Uterus: not deviated, enlarged, fixed or tender.   Cervix: No CMT, no discharge or friability.   Right " adnexa: no mass, no tenderness and no fullness.  Left adnexa: no mass, no tenderness and no fullness.   Vagina: No erythema, tenderness, masses, or foreign body in the vagina. No signs of injury around the vagina. No unusual vaginal discharge   Perineum without lesions, signs of injury, erythema or swelling.  Inguinal Canal:        Right: No inguinal adenopathy or hernia present.        Left: No inguinal adenopathy or hernia present.

## 2024-05-09 LAB
LAB AP GYN PRIMARY INTERPRETATION: NORMAL
Lab: NORMAL

## 2024-07-17 ENCOUNTER — PROCEDURE VISIT (OUTPATIENT)
Dept: OBGYN CLINIC | Facility: CLINIC | Age: 39
End: 2024-07-17
Payer: COMMERCIAL

## 2024-07-17 VITALS — DIASTOLIC BLOOD PRESSURE: 72 MMHG | BODY MASS INDEX: 33.41 KG/M2 | WEIGHT: 207 LBS | SYSTOLIC BLOOD PRESSURE: 110 MMHG

## 2024-07-17 DIAGNOSIS — B97.7 HPV (HUMAN PAPILLOMA VIRUS) INFECTION: Primary | ICD-10-CM

## 2024-07-17 LAB — SL AMB POCT URINE HCG: NORMAL

## 2024-07-17 PROCEDURE — 88305 TISSUE EXAM BY PATHOLOGIST: CPT | Performed by: PATHOLOGY

## 2024-07-17 PROCEDURE — 88344 IMHCHEM/IMCYTCHM EA MLT ANTB: CPT | Performed by: PATHOLOGY

## 2024-07-17 PROCEDURE — 81025 URINE PREGNANCY TEST: CPT | Performed by: STUDENT IN AN ORGANIZED HEALTH CARE EDUCATION/TRAINING PROGRAM

## 2024-07-17 PROCEDURE — 57454 BX/CURETT OF CERVIX W/SCOPE: CPT | Performed by: STUDENT IN AN ORGANIZED HEALTH CARE EDUCATION/TRAINING PROGRAM

## 2024-07-17 NOTE — PROGRESS NOTES
Colposcopy     Date/Time  7/17/2024 1:30 PM     Universal Protocol   Consent: Verbal consent obtained.  Risks and benefits: risks, benefits and alternatives were discussed  Consent given by: patient  Patient understanding: patient states understanding of the procedure being performed  Relevant documents: relevant documents present and verified  Test results: test results available and properly labeled  Required items: required blood products, implants, devices, and special equipment available  Patient identity confirmed: verbally with patient     Performed by  Naz Dickey DO   Authorized by  Naz Dickey DO     Pre-procedure details      Prepped with: acetic acid     Indication    Indications: HPV 18 pos.   Procedure Details   Procedure: Colposcopy w/ cervical biopsy and ECC      Under satisfactory analgesia the patient was prepped and draped in the dorsal lithotomy position: yes      [place] speculum was placed in the vagina: yes      Under colposcopic examination the transition zone was seen in entirety: yes      Endocervix was curetted using a Kevorkian curette: yes      Cervical biopsy performed with a cervical biopsy punch: yes      Biopsy(s): yes      Location:  5 oclock, 12 oclock    Specimen to pathology: yes     Post-procedure      Findings comment:  HPV effect at 5 oclock    Impression: Low grade cervical dysplasia      Patient tolerance of procedure:  Tolerated well, no immediate complications   Comments       Bleeding and infectious precautions provided

## 2024-07-23 PROCEDURE — 88305 TISSUE EXAM BY PATHOLOGIST: CPT | Performed by: PATHOLOGY

## 2024-07-23 PROCEDURE — 88344 IMHCHEM/IMCYTCHM EA MLT ANTB: CPT | Performed by: PATHOLOGY

## 2024-07-24 ENCOUNTER — TELEPHONE (OUTPATIENT)
Dept: OBGYN CLINIC | Facility: CLINIC | Age: 39
End: 2024-07-24

## 2024-07-24 NOTE — TELEPHONE ENCOUNTER
----- Message from Naz Dickey DO sent at 7/23/2024  4:23 PM EDT -----  Please double book this patient somewhere. Thanks!!!!!  ----- Message -----  From: Narcisa Nobles LPN  Sent: 7/23/2024   4:22 PM EDT  To: Naz Dickey DO; #    Reviewed results and recommendations with Astrid. Next available opening 10/15/2024 scheduled.  Advised will forward to Dr. Dickey and clerical for sooner appt. And call back if able to move up.  Added to wait list. [place] only.

## 2024-07-24 NOTE — TELEPHONE ENCOUNTER
Left vm for patient to call me back to schedule appt with Dr. Dickey     Warm transfer to me please

## 2024-08-06 ENCOUNTER — CONSULT (OUTPATIENT)
Dept: OBGYN CLINIC | Facility: CLINIC | Age: 39
End: 2024-08-06
Payer: COMMERCIAL

## 2024-08-06 ENCOUNTER — OFFICE VISIT (OUTPATIENT)
Dept: INTERNAL MEDICINE CLINIC | Facility: CLINIC | Age: 39
End: 2024-08-06
Payer: COMMERCIAL

## 2024-08-06 VITALS
OXYGEN SATURATION: 97 % | HEART RATE: 86 BPM | TEMPERATURE: 99.1 F | DIASTOLIC BLOOD PRESSURE: 64 MMHG | HEIGHT: 66 IN | BODY MASS INDEX: 33.37 KG/M2 | SYSTOLIC BLOOD PRESSURE: 120 MMHG | WEIGHT: 207.6 LBS

## 2024-08-06 VITALS — BODY MASS INDEX: 33.8 KG/M2 | WEIGHT: 209.4 LBS | SYSTOLIC BLOOD PRESSURE: 118 MMHG | DIASTOLIC BLOOD PRESSURE: 70 MMHG

## 2024-08-06 DIAGNOSIS — R63.5 WEIGHT GAIN: ICD-10-CM

## 2024-08-06 DIAGNOSIS — D06.9 HIGH GRADE SQUAMOUS INTRAEPITHELIAL LESION (HGSIL), GRADE 3 CIN, ON BIOPSY OF CERVIX: Primary | ICD-10-CM

## 2024-08-06 DIAGNOSIS — Z13.6 SCREENING FOR CARDIOVASCULAR CONDITION: ICD-10-CM

## 2024-08-06 DIAGNOSIS — Z00.00 ANNUAL PHYSICAL EXAM: Primary | ICD-10-CM

## 2024-08-06 DIAGNOSIS — L70.0 ACNE VULGARIS: ICD-10-CM

## 2024-08-06 PROBLEM — Z28.21 INFLUENZA VACCINATION DECLINED: Status: RESOLVED | Noted: 2021-10-20 | Resolved: 2024-08-06

## 2024-08-06 PROBLEM — R53.83 FATIGUE: Status: RESOLVED | Noted: 2021-10-20 | Resolved: 2024-08-06

## 2024-08-06 PROBLEM — R10.13 EPIGASTRIC ABDOMINAL PAIN: Status: RESOLVED | Noted: 2020-10-09 | Resolved: 2024-08-06

## 2024-08-06 PROBLEM — R09.82 POST-NASAL DRIP: Status: RESOLVED | Noted: 2024-01-02 | Resolved: 2024-08-06

## 2024-08-06 PROCEDURE — 99213 OFFICE O/P EST LOW 20 MIN: CPT | Performed by: STUDENT IN AN ORGANIZED HEALTH CARE EDUCATION/TRAINING PROGRAM

## 2024-08-06 PROCEDURE — 99385 PREV VISIT NEW AGE 18-39: CPT | Performed by: INTERNAL MEDICINE

## 2024-08-06 RX ORDER — EPINEPHRINE 0.3 MG/.3ML
0.3 INJECTION SUBCUTANEOUS ONCE
COMMUNITY

## 2024-08-06 NOTE — ASSESSMENT & PLAN NOTE
-7/17/24: 5 oclock cervical biopsy EUGENE III   -5/2/24: pap NILM HPV 18 pos   -discussed risks and benefits of expectant vs surgical management at this time. Pt agreeable to proceeding with LEEP.   -pt consented for loop electrode excisional procedure. Risks, benefits alternatives again discussed. All questions answered.   -follow up 2 weeks post op   -patient is interested in HPV vaccine post op

## 2024-08-06 NOTE — PROGRESS NOTES
Ambulatory Visit  Name: Astrid Kennedy      : 1985      MRN: [id number]  Encounter Provider: Naz Dickey DO  Encounter Date: 2024   Encounter department: [facility] [place]    Assessment & Plan   1. High grade squamous intraepithelial lesion (HGSIL), grade 3 EUGENE, on biopsy of cervix  Assessment & Plan:  -24: 5 oclock cervical biopsy EUGENE III   -24: pap NILM HPV 18 pos   -discussed risks and benefits of expectant vs surgical management at this time. Pt agreeable to proceeding with LEEP.   -pt consented for loop electrode excisional procedure. Risks, benefits alternatives again discussed. All questions answered.   -follow up 2 weeks post op   -patient is interested in HPV vaccine post op      History of Present Illness     Astrid Kennedy is a 39 y.o. female  presents for follow up EUGENE III found on colposcopy. Has no acute complaints. Denies vaginal bleeding or pelvic pain.     Review of Systems   Constitutional:  Negative for appetite change, chills, fatigue and fever.   Respiratory:  Negative for cough, chest tightness, shortness of breath and wheezing.    Cardiovascular:  Negative for chest pain, palpitations and leg swelling.   Gastrointestinal:  Negative for abdominal distention, abdominal pain, constipation, diarrhea, nausea and vomiting.   Endocrine: Negative for cold intolerance, heat intolerance and polyuria.   Genitourinary:  Negative for difficulty urinating, dyspareunia, dysuria, genital sores, menstrual problem, vaginal bleeding, vaginal discharge and vaginal pain.   Neurological:  Negative for dizziness, weakness, light-headedness and headaches.       Objective     /70 (BP Location: Left arm, Patient Position: Sitting, Cuff Size: Standard)   Wt 95 kg (209 lb 6.4 oz)   LMP 07/15/2024 (Exact Date)   BMI 33.80 kg/m²     Physical Exam  Constitutional:       General: She is not in acute distress.     Appearance: Normal appearance.  Medications that are requested 1    [facility]    [facility]    Patient pharmacy entered in [facility]    Patient notified that refill may take 48-72 hoursYes    Callback Number:   Telephone Information:   Mobile [phone]         Best Availability: anytime    Can A Detailed Message Be Left?Yes     Additional Info: none    Informed patient to check with their pharmacy for the status of the refill and they will only be contacted if there is an issue with the refillYes   She is not ill-appearing.   Cardiovascular:      Rate and Rhythm: Normal rate.   Pulmonary:      Effort: Pulmonary effort is normal. No respiratory distress.   Abdominal:      General: Abdomen is flat. There is no distension.      Palpations: Abdomen is soft.      Tenderness: There is no abdominal tenderness. There is no guarding or rebound.   Musculoskeletal:      Right lower leg: No edema.      Left lower leg: No edema.   Neurological:      General: No focal deficit present.      Mental Status: She is alert and oriented to person, place, and time.   Psychiatric:         Mood and Affect: Mood normal.         Behavior: Behavior normal.         Thought Content: Thought content normal.         Judgment: Judgment normal.       Administrative Statements

## 2024-08-06 NOTE — PATIENT INSTRUCTIONS
"Patient Education     Routine physical for adults   The Basics   Written by the doctors and editors at [facility]   What is a physical? -- A physical is a routine visit, or \"check-up,\" with your doctor. You might also hear it called a \"wellness visit\" or \"preventive visit.\"  During each visit, the doctor will:   Ask about your physical and mental health   Ask about your habits, behaviors, and lifestyle   Do an exam   Give you vaccines if needed   Talk to you about any medicines you take   Give advice about your health   Answer your questions  Getting regular check-ups is an important part of taking care of your health. It can help your doctor find and treat any problems you have. But it's also important for preventing health problems.  A routine physical is different from a \"sick visit.\" A sick visit is when you see a doctor because of a health concern or problem. Since physicals are scheduled ahead of time, you can think about what you want to ask the doctor.  How often should I get a physical? -- It depends on your age and health. In general, for people age 21 years and older:   If you are younger than 50 years, you might be able to get a physical every 3 years.   If you are 50 years or older, your doctor might recommend a physical every year.  If you have an ongoing health condition, like diabetes or high blood pressure, your doctor will probably want to see you more often.  What happens during a physical? -- In general, each visit will include:   Physical exam - The doctor or nurse will check your height, weight, heart rate, and blood pressure. They will also look at your eyes and ears. They will ask about how you are feeling and whether you have any symptoms that bother you.   Medicines - It's a good idea to bring a list of all the medicines you take to each doctor visit. Your doctor will talk to you about your medicines and answer any questions. Tell them if you are having any side effects that bother you. You " "should also tell them if you are having trouble paying for any of your medicines.   Habits and behaviors - This includes:   Your diet   Your exercise habits   Whether you smoke, drink alcohol, or use drugs   Whether you are sexually active   Whether you feel safe at home  Your doctor will talk to you about things you can do to improve your health and lower your risk of health problems. They will also offer help and support. For example, if you want to quit smoking, they can give you advice and might prescribe medicines. If you want to improve your diet or get more physical activity, they can help you with this, too.   Lab tests, if needed - The tests you get will depend on your age and situation. For example, your doctor might want to check your:   Cholesterol   Blood sugar   Iron level   Vaccines - The recommended vaccines will depend on your age, health, and what vaccines you already had. Vaccines are very important because they can prevent certain serious or deadly infections.   Discussion of screening - \"Screening\" means checking for diseases or other health problems before they cause symptoms. Your doctor can recommend screening based on your age, risk, and preferences. This might include tests to check for:   Cancer, such as breast, prostate, cervical, ovarian, colorectal, prostate, lung, or skin cancer   Sexually transmitted infections, such as chlamydia and gonorrhea   Mental health conditions like depression and anxiety  Your doctor will talk to you about the different types of screening tests. They can help you decide which screenings to have. They can also explain what the results might mean.   Answering questions - The physical is a good time to ask the doctor or nurse questions about your health. If needed, they can refer you to other doctors or specialists, too.  Adults older than 65 years often need other care, too. As you get older, your doctor will talk to you about:   How to prevent falling at " home   Hearing or vision tests   Memory testing   How to take your medicines safely   Making sure that you have the help and support you need at home  All topics are updated as new evidence becomes available and our peer review process is complete.  This topic retrieved from [employer] on: May 02, 2024.  Topic 567188 Version 1.0  Release: 32.4.3 - C32.122  © 2024 UpToDate, Inc. and/or its affiliates. All rights reserved.  Consumer Information Use and Disclaimer   Disclaimer: This generalized information is a limited summary of diagnosis, treatment, and/or medication information. It is not meant to be comprehensive and should be used as a tool to help the user understand and/or assess potential diagnostic and treatment options. It does NOT include all information about conditions, treatments, medications, side effects, or risks that may apply to a specific patient. It is not intended to be medical advice or a substitute for the medical advice, diagnosis, or treatment of a health care provider based on the health care provider's examination and assessment of a patient's specific and unique circumstances. Patients must speak with a health care provider for complete information about their health, medical questions, and treatment options, including any risks or benefits regarding use of medications. This information does not endorse any treatments or medications as safe, effective, or approved for treating a specific patient. UpToDate, Inc. and its affiliates disclaim any warranty or liability relating to this information or the use thereof.The use of this information is governed by the Terms of Use, available at https://www.woltersTuniiuwer.com/en/know/clinical-effectiveness-terms. 2024© UpToDate, Inc. and its affiliates and/or licensors. All rights reserved.  Copyright   © 2024 UpToDate, Inc. and/or its affiliates. All rights reserved.

## 2024-08-06 NOTE — PROGRESS NOTES
Adult Annual Physical  Name: Astrid Kennedy      : 1985      MRN: [id number]  Encounter Provider: Noelle Ewing DO  Encounter Date: 2024   Encounter department: [facility] INTERNAL MEDICINE    Assessment & Plan   1. Annual physical exam  2. Screening for cardiovascular condition  -     Lipid panel; Future; Expected date: 2024  3. Weight gain  Comments:  -likely due to diet  -restart weight watchers  -obtain TSH  Orders:  -     TSH, 3rd generation with Free T4 reflex; Future; Expected date: 2024  -     Comprehensive metabolic panel; Future; Expected date: 2024  4. Acne vulgaris  Assessment & Plan:  -on spironolactone per Derm  -monitor CMP  Orders:  -     Comprehensive metabolic panel; Future; Expected date: 2024  Immunizations and preventive care screenings were discussed with patient today. Appropriate education was printed on patient's after visit summary.    Counseling:  Dental Health: discussed importance of regular tooth brushing, flossing, and dental visits.  Sexual health: discussed sexually transmitted diseases, partner selection, use of condoms, avoidance of unintended pregnancy, and contraceptive alternatives.  Exercise: the importance of regular exercise/physical activity was discussed. Recommend exercise 3-5 times per week for at least 30 minutes.   Immunizations discussed.  Recommend yearly influenza vaccine, updated COVID vaccine.  She is unsure if she received HPV series.  She is up to date with tdap.  Cancer screenings discussed.  PAP per GYN.         History of Present Illness     Adult Annual Physical:  Patient presents for annual physical. 38yo female here as a new patient.    She lost 45pounds with weight watchers last year but regained it when she stopped watching what she eats.  Also was leisurely working out as well..     Diet and Physical Activity:  - Diet/Nutrition:. regular diet  - Exercise: no formal exercise.    Depression  Screening:  - PHQ-2 Score: 0    General Health:  - Sleep: sleeps well.  - Hearing: normal hearing bilateral ears.  - Vision: wears glasses and contacts and goes for regular eye exams.  - Dental: regular dental visits.    /GYN Health:  - Follows with GYN: yes.   - Menopause: premenopausal.   - History of STDs: yes    Advanced Care Planning:  - Has an advanced directive?: no    - Has a durable medical POA?: no    - ACP document given to patient?: yes      Review of Systems   Constitutional:  Negative for activity change and appetite change.   HENT:  Negative for congestion, postnasal drip and rhinorrhea.    Respiratory:  Negative for cough, shortness of breath and wheezing.    Cardiovascular:  Negative for chest pain, palpitations and leg swelling.   Gastrointestinal:  Negative for abdominal pain, diarrhea and nausea.   Genitourinary:  Negative for difficulty urinating and menstrual problem.   Musculoskeletal:  Positive for arthralgias (hip pain) and back pain.   Neurological:  Negative for dizziness and headaches.   Psychiatric/Behavioral:  Negative for dysphoric mood and sleep disturbance. The patient is nervous/anxious.      Medical History Reviewed by provider this encounter:  Tobacco  Allergies  Meds  Problems  Med Hx  Surg Hx  Fam Hx       Current Outpatient Medications on File Prior to Visit   Medication Sig Dispense Refill   • clindamycin (CLEOCIN T) 1 % lotion Apply topically every morning Apply to affected area     • clobetasol (TEMOVATE) 0.05 % cream APPLY TWICE DAILY TO RASH FOR 4-5 WEEKS     • EPINEPHrine (EPIPEN) 0.3 mg/0.3 mL SOAJ Inject 0.3 mg into a muscle once     • spironolactone (ALDACTONE) 50 mg tablet TAKE 2 TABLETS IN THE MORNING AND 1 TABLET IN THE EVENING     • tretinoin (RETIN-A) 0.025 % cream APPLY SPARINGLY TO FACE NIGHTLY       No current facility-administered medications on file prior to visit.      Social History     Tobacco Use   • Smoking status: Never   • Smokeless tobacco:  "Never   Vaping Use   • Vaping status: Never Used   Substance and Sexual Activity   • Alcohol use: No   • Drug use: Never   • Sexual activity: Yes     Partners: Male     Birth control/protection: Male Sterilization       Objective     /64 (BP Location: Left arm, Patient Position: Sitting, Cuff Size: Large)   Pulse 86   Temp 99.1 °F (37.3 °C) (Tympanic)   Ht 5' 6\" (1.676 m)   Wt 94.2 kg (207 lb 9.6 oz)   SpO2 97%   BMI 33.51 kg/m²     Physical Exam  Vitals reviewed.   Constitutional:       General: She is not in acute distress.  HENT:      Head: Normocephalic.      Right Ear: Tympanic membrane and ear canal normal.      Left Ear: Tympanic membrane and ear canal normal.      Nose: Nose normal.      Mouth/Throat:      Mouth: Mucous membranes are moist.   Eyes:      Extraocular Movements: Extraocular movements intact.      Conjunctiva/sclera: Conjunctivae normal.      Pupils: Pupils are equal, round, and reactive to light.   Neck:      Thyroid: No thyromegaly or thyroid tenderness.   Cardiovascular:      Rate and Rhythm: Normal rate and regular rhythm.      Pulses: Normal pulses.      Heart sounds: Normal heart sounds.   Pulmonary:      Effort: Pulmonary effort is normal. No respiratory distress.      Breath sounds: Normal breath sounds. No wheezing.   Abdominal:      General: Bowel sounds are normal. There is no distension.      Palpations: Abdomen is soft.      Tenderness: There is no abdominal tenderness.   Musculoskeletal:      Cervical back: Neck supple. No tenderness.      Right lower leg: No edema.      Left lower leg: No edema.   Lymphadenopathy:      Cervical: No cervical adenopathy.   Skin:     Coloration: Skin is not pale.   Neurological:      General: No focal deficit present.      Mental Status: She is alert and oriented to person, place, and time.   Psychiatric:         Mood and Affect: Mood normal.         "

## 2024-08-08 ENCOUNTER — TELEPHONE (OUTPATIENT)
Age: 39
End: 2024-08-08

## 2024-08-08 NOTE — TELEPHONE ENCOUNTER
Patient called inquiring about scheduling Leep procedure. Pt had pre-op appt 8/6 with Dr. Dickey. Pt also inquired on what the anticipated recovery time is & how it will be stated to inform HR. Attempted warm transfer. Please reach out to pt

## 2024-08-09 ENCOUNTER — TELEPHONE (OUTPATIENT)
Dept: OBGYN CLINIC | Facility: CLINIC | Age: 39
End: 2024-08-09

## 2024-08-09 NOTE — TELEPHONE ENCOUNTER
Talked to patient she is scheduled for her surgical procedure with Dr. Dickey on 10/7/2024 at the [facility]. Patient is aware of her lab work needed prior to her surgical procedure. Patient has a 2 week post op appointment on 10/24/2024 at the [facility].

## 2024-08-09 NOTE — TELEPHONE ENCOUNTER
----- Message from Naz Dickey DO sent at 2024  3:42 PM EDT -----  Regarding: Surgery Scheduling  [facility] GYN Department  Surgery Scheduling Sheet    Patient Name: Astrid Kennedy  : 1985    Provider: Naz Dickey DO     Needed: no; Language: N/A    Procedure: exam under anesthesia and loop electrosurgical excision procedure    Diagnosis: EUGENE III    Special Needs or Equipment: none    Anesthesia: IV sedation with anesthesia    Length of stay: outpatient  Does patient have comorbid conditions that will require close perioperative monitoring prior to safe discharge: no    The patient has comorbid conditions that will require close perioperative monitoring prior to safe discharge, including N/A.   This may require acute care beyond the usual and routine recovery period. As such, inpatient admission post-operatively is expected and appropriate, and anticipated hospital length of stay will be >2 midnights.    Pre-Admission Testing Needed: yes   Labs that should be ordered: cbc, type and screen, and urine pregnancy test    Order PAT that is recommended in prep for procedure?: Yes    Medical Clearance Needed: no; Provider: N/A    MA Form Signed (tubals/hysterectomy): Not Indicated    Surgical Drink Given: no     How many days out of work: 1 week(s)     How many days no drivin week(s)       Is pre op appt needed?  no  Interval for post op appt: 2 week(s)     For Surgical Scheduler:     Surgery Scheduled On:  [place]: [facility], [facility], [facility], [facility], and [facility]    Pre-op Appt:   Post op Appt:  Consult/Medical clearance appt:

## 2024-08-15 ENCOUNTER — TELEPHONE (OUTPATIENT)
Age: 39
End: 2024-08-15

## 2024-08-15 NOTE — TELEPHONE ENCOUNTER
Patient called and said she is going to upload on my chart to Dr Dickey her FMLA for [employer] and she would need it completed by 8/26.  She asked if you could please place back in my chart. She also asked if the surgery scheduler could please update the return to work note and she asked if you could please add with no restrictions when she returns back to work.  She asked if this could please be placed in my chart also.  If there are any questions please call her back at [phone] .  Thank you

## 2024-08-19 ENCOUNTER — DOCUMENTATION (OUTPATIENT)
Dept: OBGYN CLINIC | Facility: CLINIC | Age: 39
End: 2024-08-19

## 2024-09-11 ENCOUNTER — APPOINTMENT (OUTPATIENT)
Dept: LAB | Facility: CLINIC | Age: 39
End: 2024-09-11
Payer: COMMERCIAL

## 2024-09-11 DIAGNOSIS — L70.0 ACNE VULGARIS: ICD-10-CM

## 2024-09-11 DIAGNOSIS — R63.5 WEIGHT GAIN: ICD-10-CM

## 2024-09-11 DIAGNOSIS — Z13.6 SCREENING FOR CARDIOVASCULAR CONDITION: ICD-10-CM

## 2024-09-11 DIAGNOSIS — Z01.818 PRE-OP TESTING: ICD-10-CM

## 2024-09-11 LAB
ABO GROUP BLD: NORMAL
ALBUMIN SERPL BCG-MCNC: 4.1 G/DL (ref 3.5–5)
ALP SERPL-CCNC: 71 U/L (ref 34–104)
ALT SERPL W P-5'-P-CCNC: 10 U/L (ref 7–52)
ANION GAP SERPL CALCULATED.3IONS-SCNC: 5 MMOL/L (ref 4–13)
AST SERPL W P-5'-P-CCNC: 11 U/L (ref 13–39)
BILIRUB SERPL-MCNC: 0.44 MG/DL (ref 0.2–1)
BLD GP AB SCN SERPL QL: NEGATIVE
BUN SERPL-MCNC: 13 MG/DL (ref 5–25)
CALCIUM SERPL-MCNC: 8.9 MG/DL (ref 8.4–10.2)
CHLORIDE SERPL-SCNC: 103 MMOL/L (ref 96–108)
CHOLEST SERPL-MCNC: 194 MG/DL
CO2 SERPL-SCNC: 28 MMOL/L (ref 21–32)
CREAT SERPL-MCNC: 0.75 MG/DL (ref 0.6–1.3)
ERYTHROCYTE [DISTWIDTH] IN BLOOD BY AUTOMATED COUNT: 12.6 % (ref 11.6–15.1)
GFR SERPL CREATININE-BSD FRML MDRD: 102 ML/MIN/1.73SQ M
GLUCOSE P FAST SERPL-MCNC: 92 MG/DL (ref 65–99)
HCT VFR BLD AUTO: 40.9 % (ref 34.8–46.1)
HDLC SERPL-MCNC: 46 MG/DL
HGB BLD-MCNC: 13.2 G/DL (ref 11.5–15.4)
LDLC SERPL CALC-MCNC: 100 MG/DL (ref 0–100)
MCH RBC QN AUTO: 28.7 PG (ref 26.8–34.3)
MCHC RBC AUTO-ENTMCNC: 32.3 G/DL (ref 31.4–37.4)
MCV RBC AUTO: 89 FL (ref 82–98)
NONHDLC SERPL-MCNC: 148 MG/DL
PLATELET # BLD AUTO: 307 THOUSANDS/UL (ref 149–390)
PMV BLD AUTO: 9 FL (ref 8.9–12.7)
POTASSIUM SERPL-SCNC: 3.9 MMOL/L (ref 3.5–5.3)
PROT SERPL-MCNC: 7.5 G/DL (ref 6.4–8.4)
RBC # BLD AUTO: 4.6 MILLION/UL (ref 3.81–5.12)
RH BLD: POSITIVE
SODIUM SERPL-SCNC: 136 MMOL/L (ref 135–147)
SPECIMEN EXPIRATION DATE: NORMAL
TRIGL SERPL-MCNC: 238 MG/DL
TSH SERPL DL<=0.05 MIU/L-ACNC: 1.47 UIU/ML (ref 0.45–4.5)
WBC # BLD AUTO: 6.9 THOUSAND/UL (ref 4.31–10.16)

## 2024-09-11 PROCEDURE — 84443 ASSAY THYROID STIM HORMONE: CPT

## 2024-09-11 PROCEDURE — 85027 COMPLETE CBC AUTOMATED: CPT

## 2024-09-11 PROCEDURE — 36415 COLL VENOUS BLD VENIPUNCTURE: CPT

## 2024-09-11 PROCEDURE — 80061 LIPID PANEL: CPT

## 2024-09-11 PROCEDURE — 86901 BLOOD TYPING SEROLOGIC RH(D): CPT

## 2024-09-11 PROCEDURE — 86850 RBC ANTIBODY SCREEN: CPT

## 2024-09-11 PROCEDURE — 86900 BLOOD TYPING SEROLOGIC ABO: CPT

## 2024-09-11 PROCEDURE — 80053 COMPREHEN METABOLIC PANEL: CPT

## 2024-09-23 ENCOUNTER — ANESTHESIA EVENT (OUTPATIENT)
Dept: PERIOP | Facility: AMBULARY SURGERY CENTER | Age: 39
End: 2024-09-23
Payer: COMMERCIAL

## 2024-09-26 NOTE — PRE-PROCEDURE INSTRUCTIONS
Pre-Surgery Instructions:   Medication Instructions    clindamycin (CLEOCIN T) 1 % lotion Stop taking 1 day prior to surgery.    clobetasol (TEMOVATE) 0.05 % cream Stop taking 1 day prior to surgery.    spironolactone (ALDACTONE) 50 mg tablet Hold day of surgery.    tretinoin (RETIN-A) 0.025 % cream Stop taking 1 day prior to surgery.      Medication instructions for day surgery reviewed. Please use only a sip of water to take your instructed medications. Avoid all over the counter vitamins, supplements and NSAIDS for one week prior to surgery per anesthesia guidelines. Tylenol is ok to take as needed.     You will receive a call one business day prior to surgery with an arrival time and hospital directions. If your surgery is scheduled on a Monday, the hospital will be calling you on the Friday prior to your surgery. If you have not heard from anyone by 8pm, please call the hospital supervisor through the hospital  at [phone]. ([place] [phone] or [place] [phone]).    Do not eat or drink anything after midnight the night before your surgery, including candy, mints, lifesavers, or chewing gum. Do not drink alcohol 24hrs before your surgery. Try not to smoke at least 24hrs before your surgery.       Follow the pre surgery showering instructions as listed in the “My Surgical Experience Booklet” or otherwise provided by your surgeon's office. Do not use a blade to shave the surgical area 1 week before surgery. It is okay to use a clean electric clippers up to 24 hours before surgery. Do not apply any lotions, creams, including makeup, cologne, deodorant, or perfumes after showering on the day of your surgery. Do not use dry shampoo, hair spray, hair gel, or any type of hair products.     No contact lenses, eye make-up, or artificial eyelashes. Remove nail polish, including gel polish, and any artificial, gel, or acrylic nails if possible. Remove all jewelry including rings and body piercing  jewelry.     Wear causal clothing that is easy to take on and off. Consider your type of surgery.    Keep any valuables, jewelry, piercings at home. Please bring any specially ordered equipment (sling, braces) if indicated.    Arrange for a responsible person to drive you to and from the hospital on the day of your surgery. Please confirm the visitor policy for the day of your procedure when you receive your phone call with an arrival time.     Call the surgeon's office with any new illnesses, exposures, or additional questions prior to surgery.    Please reference your “My Surgical Experience Booklet” for additional information to prepare for your upcoming surgery.

## 2024-10-07 ENCOUNTER — HOSPITAL ENCOUNTER (OUTPATIENT)
Facility: AMBULARY SURGERY CENTER | Age: 39
Setting detail: OUTPATIENT SURGERY
Discharge: HOME | End: 2024-10-07
Attending: STUDENT IN AN ORGANIZED HEALTH CARE EDUCATION/TRAINING PROGRAM | Admitting: STUDENT IN AN ORGANIZED HEALTH CARE EDUCATION/TRAINING PROGRAM
Payer: COMMERCIAL

## 2024-10-07 ENCOUNTER — ANESTHESIA (OUTPATIENT)
Dept: PERIOP | Facility: AMBULARY SURGERY CENTER | Age: 39
End: 2024-10-07
Payer: COMMERCIAL

## 2024-10-07 VITALS
OXYGEN SATURATION: 100 % | SYSTOLIC BLOOD PRESSURE: 99 MMHG | TEMPERATURE: 97.8 F | HEIGHT: 66 IN | HEART RATE: 60 BPM | BODY MASS INDEX: 33.27 KG/M2 | RESPIRATION RATE: 18 BRPM | DIASTOLIC BLOOD PRESSURE: 56 MMHG | WEIGHT: 207 LBS

## 2024-10-07 DIAGNOSIS — D06.9 CIN III (CERVICAL INTRAEPITHELIAL NEOPLASIA GRADE III) WITH SEVERE DYSPLASIA: ICD-10-CM

## 2024-10-07 DIAGNOSIS — Z98.890 STATUS POST LEEP (LOOP ELECTROSURGICAL EXCISION PROCEDURE) OF CERVIX: Primary | ICD-10-CM

## 2024-10-07 LAB
EXT PREGNANCY TEST URINE: NEGATIVE
EXT. CONTROL: NORMAL

## 2024-10-07 PROCEDURE — 88307 TISSUE EXAM BY PATHOLOGIST: CPT | Performed by: PATHOLOGY

## 2024-10-07 PROCEDURE — NC001 PR NO CHARGE: Performed by: STUDENT IN AN ORGANIZED HEALTH CARE EDUCATION/TRAINING PROGRAM

## 2024-10-07 PROCEDURE — 81025 URINE PREGNANCY TEST: CPT | Performed by: STUDENT IN AN ORGANIZED HEALTH CARE EDUCATION/TRAINING PROGRAM

## 2024-10-07 PROCEDURE — 88305 TISSUE EXAM BY PATHOLOGIST: CPT | Performed by: PATHOLOGY

## 2024-10-07 PROCEDURE — 57522 CONIZATION OF CERVIX: CPT | Performed by: STUDENT IN AN ORGANIZED HEALTH CARE EDUCATION/TRAINING PROGRAM

## 2024-10-07 PROCEDURE — 88344 IMHCHEM/IMCYTCHM EA MLT ANTB: CPT | Performed by: PATHOLOGY

## 2024-10-07 RX ORDER — SODIUM CHLORIDE, SODIUM LACTATE, POTASSIUM CHLORIDE, CALCIUM CHLORIDE 600; 310; 30; 20 MG/100ML; MG/100ML; MG/100ML; MG/100ML
125 INJECTION, SOLUTION INTRAVENOUS CONTINUOUS
Status: DISCONTINUED | OUTPATIENT
Start: 2024-10-07 | End: 2024-10-07 | Stop reason: HOSPADM

## 2024-10-07 RX ORDER — ONDANSETRON 2 MG/ML
4 INJECTION INTRAMUSCULAR; INTRAVENOUS EVERY 6 HOURS PRN
Status: DISCONTINUED | OUTPATIENT
Start: 2024-10-07 | End: 2024-10-07 | Stop reason: HOSPADM

## 2024-10-07 RX ORDER — IBUPROFEN 600 MG/1
600 TABLET, FILM COATED ORAL EVERY 6 HOURS PRN
Status: DISCONTINUED | OUTPATIENT
Start: 2024-10-07 | End: 2024-10-07 | Stop reason: HOSPADM

## 2024-10-07 RX ORDER — MIDAZOLAM HYDROCHLORIDE 2 MG/2ML
INJECTION, SOLUTION INTRAMUSCULAR; INTRAVENOUS AS NEEDED
Status: DISCONTINUED | OUTPATIENT
Start: 2024-10-07 | End: 2024-10-07

## 2024-10-07 RX ORDER — MAGNESIUM HYDROXIDE 1200 MG/15ML
LIQUID ORAL AS NEEDED
Status: DISCONTINUED | OUTPATIENT
Start: 2024-10-07 | End: 2024-10-07 | Stop reason: HOSPADM

## 2024-10-07 RX ORDER — PROPOFOL 10 MG/ML
INJECTION, EMULSION INTRAVENOUS CONTINUOUS PRN
Status: DISCONTINUED | OUTPATIENT
Start: 2024-10-07 | End: 2024-10-07

## 2024-10-07 RX ORDER — LIDOCAINE HYDROCHLORIDE 10 MG/ML
INJECTION, SOLUTION EPIDURAL; INFILTRATION; INTRACAUDAL; PERINEURAL AS NEEDED
Status: DISCONTINUED | OUTPATIENT
Start: 2024-10-07 | End: 2024-10-07

## 2024-10-07 RX ORDER — ONDANSETRON 2 MG/ML
4 INJECTION INTRAMUSCULAR; INTRAVENOUS ONCE AS NEEDED
Status: DISCONTINUED | OUTPATIENT
Start: 2024-10-07 | End: 2024-10-07 | Stop reason: HOSPADM

## 2024-10-07 RX ORDER — IBUPROFEN 600 MG/1
600 TABLET, FILM COATED ORAL EVERY 6 HOURS PRN
Qty: 30 TABLET | Refills: 0 | Status: SHIPPED | OUTPATIENT
Start: 2024-10-07

## 2024-10-07 RX ORDER — PROPOFOL 10 MG/ML
INJECTION, EMULSION INTRAVENOUS AS NEEDED
Status: DISCONTINUED | OUTPATIENT
Start: 2024-10-07 | End: 2024-10-07

## 2024-10-07 RX ORDER — FENTANYL CITRATE 50 UG/ML
INJECTION, SOLUTION INTRAMUSCULAR; INTRAVENOUS AS NEEDED
Status: DISCONTINUED | OUTPATIENT
Start: 2024-10-07 | End: 2024-10-07

## 2024-10-07 RX ORDER — FENTANYL CITRATE/PF 50 MCG/ML
25 SYRINGE (ML) INJECTION
Status: DISCONTINUED | OUTPATIENT
Start: 2024-10-07 | End: 2024-10-07 | Stop reason: HOSPADM

## 2024-10-07 RX ORDER — SODIUM CHLORIDE, SODIUM LACTATE, POTASSIUM CHLORIDE, CALCIUM CHLORIDE 600; 310; 30; 20 MG/100ML; MG/100ML; MG/100ML; MG/100ML
INJECTION, SOLUTION INTRAVENOUS CONTINUOUS PRN
Status: DISCONTINUED | OUTPATIENT
Start: 2024-10-07 | End: 2024-10-07

## 2024-10-07 RX ADMIN — PROPOFOL 20 MG: 10 INJECTION, EMULSION INTRAVENOUS at 10:51

## 2024-10-07 RX ADMIN — PROPOFOL 30 MG: 10 INJECTION, EMULSION INTRAVENOUS at 11:01

## 2024-10-07 RX ADMIN — SODIUM CHLORIDE, SODIUM LACTATE, POTASSIUM CHLORIDE, AND CALCIUM CHLORIDE: .6; .31; .03; .02 INJECTION, SOLUTION INTRAVENOUS at 10:42

## 2024-10-07 RX ADMIN — PROPOFOL 20 MG: 10 INJECTION, EMULSION INTRAVENOUS at 10:56

## 2024-10-07 RX ADMIN — DEXMEDETOMIDINE 4 MCG: 100 INJECTION, SOLUTION INTRAVENOUS at 11:08

## 2024-10-07 RX ADMIN — FENTANYL CITRATE 25 MCG: 50 INJECTION INTRAMUSCULAR; INTRAVENOUS at 11:04

## 2024-10-07 RX ADMIN — MIDAZOLAM 2 MG: 1 INJECTION INTRAMUSCULAR; INTRAVENOUS at 10:45

## 2024-10-07 RX ADMIN — IBUPROFEN 600 MG: 600 TABLET, FILM COATED ORAL at 12:25

## 2024-10-07 RX ADMIN — PROPOFOL 80 MCG/KG/MIN: 10 INJECTION, EMULSION INTRAVENOUS at 10:50

## 2024-10-07 RX ADMIN — LIDOCAINE HYDROCHLORIDE 30 MG: 10 INJECTION, SOLUTION EPIDURAL; INFILTRATION; INTRACAUDAL; PERINEURAL at 10:50

## 2024-10-07 NOTE — H&P
H&P Exam - Gynecology   Astrid Kennedy 39 y.o. female MRN: [id number]  Unit/Bed#: OR POOL Encounter: [id number]    Assessment & Plan     EUGENE 3   -24: 5 oclock cervical biopsy EUGENE III   -24: pap NILM HPV 18 pos   -will proceed with loop electrode excisional procedure. Risks, benefits, alternatives discussed. All questions answered.   -bleeding and infectious precautions provided  -follow up in 2 weeks     History of Present Illness   HPI:  Astrid Kennedy is a 39 y.o. female  presents for LEEP due to EUGENE 3 found on colposcopy. Has no acute complaints.     Review of Systems   Constitutional:  Negative for appetite change, chills, fatigue and fever.   Respiratory:  Negative for cough, chest tightness, shortness of breath and wheezing.    Cardiovascular:  Negative for chest pain, palpitations and leg swelling.   Gastrointestinal:  Negative for abdominal distention, abdominal pain, constipation, diarrhea, nausea and vomiting.   Endocrine: Negative for cold intolerance, heat intolerance and polyuria.   Genitourinary:  Negative for difficulty urinating, dyspareunia, dysuria, genital sores, menstrual problem, vaginal bleeding, vaginal discharge and vaginal pain.   Neurological:  Negative for dizziness, weakness, light-headedness and headaches.       Historical Information   Past Medical History:   Diagnosis Date    Abnormal Pap smear of cervix         Allergic     Asthma     Disease of thyroid gland     enlarged thyroid    Epigastric abdominal pain 10/09/2020    Fatigue 10/20/2021    HPV (human papilloma virus) infection     Influenza vaccination declined 10/20/2021    Post-nasal drip 2024    Varicella     Visual impairment     contacts in     Past Surgical History:   Procedure Laterality Date    TONSILECTOMY AND ADNOIDECTOMY      WISDOM TOOTH EXTRACTION       OB History    Para Term  AB Living   2 2 2   0 2   SAB IAB Ectopic Multiple Live Births         0 2      # Outcome  "Date GA Lbr Chiki/2nd Weight Sex Type Anes PTL Lv   2 Term 05/20/16 40w0d / 00:49 3232 g (7 lb 2 oz) F Vag-Spont Local N JOSE   1 Term 01/17/11 40w0d  3147 g (6 lb 15 oz) M Vag-Spont   JOSE      Obstetric Comments   Menarche 12   2 vaginal deliveries      Family History   Problem Relation Age of Onset    Diabetes Mother     Hypertension Mother     Pulmonary embolism Mother     Deep vein thrombosis Mother     Hypertension Father     Prostate cancer Father     Diabetes Father     No Known Problems Sister     No Known Problems Son     No Known Problems Daughter     No Known Problems Maternal Grandmother     No Known Problems Maternal Grandfather     Breast cancer Paternal Grandmother     Prostate cancer Paternal Grandfather     Ovarian cancer Neg Hx      Social History   Social History     Substance and Sexual Activity   Alcohol Use Yes    Comment: Social     Social History     Substance and Sexual Activity   Drug Use Never     Social History     Tobacco Use   Smoking Status Never   Smokeless Tobacco Never       Meds/Allergies   all current active meds have been reviewed  Allergies   Allergen Reactions    Peanut-Containing Drug Products - Food Allergy Anaphylaxis    Treenut [Nuts - Food Allergy] Anaphylaxis    Advair Diskus [Fluticasone-Salmeterol] Hives    Shellfish-Derived Products - Food Allergy     Tylenol [Acetaminophen]        Objective   Vitals: Blood pressure 106/65, pulse 65, temperature (!) 97.3 °F (36.3 °C), temperature source Temporal, resp. rate 14, height 5' 6\" (1.676 m), weight 93.9 kg (207 lb), SpO2 98%, not currently breastfeeding.    No intake or output data in the 24 hours ending 10/07/24 1010    Invasive Devices:   Invasive Devices       Peripheral Intravenous Line  Duration             Peripheral IV 10/07/24 Right Antecubital <1 day                    Physical Exam  Constitutional:       General: She is not in acute distress.     Appearance: Normal appearance. She is normal weight.   Cardiovascular:    "   Rate and Rhythm: Normal rate.   Pulmonary:      Effort: Pulmonary effort is normal. No respiratory distress.   Abdominal:      General: There is no distension.      Palpations: There is no mass.      Tenderness: There is no abdominal tenderness. There is no guarding or rebound.   Musculoskeletal:      Right lower leg: No edema.      Left lower leg: No edema.   Neurological:      Mental Status: She is alert and oriented to person, place, and time.   Psychiatric:         Mood and Affect: Mood normal.         Lab Results: I have personally reviewed pertinent reports.    Imaging: I have personally reviewed pertinent reports.    Pathology, and Other Studies: I have personally reviewed pertinent reports.

## 2024-10-07 NOTE — OP NOTE
OPERATIVE REPORT  PATIENT NAME: Astrdi Kennedy    :  1985  MRN: [id number]  Pt Location: [facility] OR ROOM 05    SURGERY DATE: 10/7/2024    Surgeons and Role:     * Naz Dickey DO - Primary     * Sara Curtis MD - Assisting    Preop Diagnosis:  EUGENE III (cervical intraepithelial neoplasia grade III) with severe dysplasia [D06.9]    Post-Op Diagnosis Codes:     * EUGENE III (cervical intraepithelial neoplasia grade III) with severe dysplasia [D06.9]    Procedure(s):  LOOP ELECTRODE EXCISIONAL PROCEDURE    Specimen(s):  ID Type Source Tests Collected by Time Destination   1 : Cervical Biopsy; stitch marks 12 o'clock Tissue Vaginal/Cervical TISSUE EXAM Naz Dickey DO 10/7/2024 1107    2 : Endocervical Curettings Tissue Cervix, Endocervical TISSUE EXAM Naz Dickey DO 10/7/2024 1112        Estimated Blood Loss:   Minimal    Drains:  * No LDAs found *    Anesthesia Type:   IV Sedation with Anesthesia    Operative Indications:  EUGENE III (cervical intraepithelial neoplasia grade III) with severe dysplasia [D06.9]    Operative Findings:  -cervical biopsy performed without difficulty  -fulgurate and monsel's applied for added hemostasis at end of procedure     Complications:   None    Procedure and Technique:  Patient was taken to the operative room and placed under general anesthesia without difficulty. She was placed in the dorsal lithotomy position and prepped and draped in the normal sterile fashion. The bladder as emptied with a sterile catheter. A weighted speculum was placed into the vagina and the cervix was visualized. Acetic acid was applied to the cervix.  A large loop electrode was used to excise the cervical sample using cut starting at 3 oclock and moving toward 9 oclock.     The specimen was removed and sent to pathology. Ball cautery was used to obtain hemostasis. Monsel's was added to cervical biopsy for added hemostasis. The weighted speculum was removed from the vagina. The patient was cleaned,  removed from the dorsal lithotomy position, awoken from general anesthesia, and taken to the recovery room in stable condition. There were no complications from the procedure. Sponge, lap, and needle counts were correct at the end of the procedure.      I was present for the entire procedure.    Patient Disposition:  PACU              SIGNATURE: Naz Dickey DO  DATE: October 7, 2024  TIME: 11:14 AM

## 2024-10-07 NOTE — ANESTHESIA PREPROCEDURE EVALUATION
Procedure:  LOOP ELECTRODE EXCISIONAL PROCEDURE (Cervix)    Relevant Problems   ANESTHESIA (within normal limits)      CARDIO (within normal limits)      ENDO (within normal limits)      GI/HEPATIC (within normal limits)      /RENAL (within normal limits)      GYN (within normal limits)      HEMATOLOGY (within normal limits)      MUSCULOSKELETAL   (+) Acute bilateral low back pain without sciatica      NEURO/PSYCH (within normal limits)      PULMONARY (within normal limits)        Physical Exam    Airway    Mallampati score: III  TM Distance: >3 FB  Neck ROM: full     Dental   No notable dental hx     Cardiovascular  Rhythm: regular, Rate: normal, Cardiovascular exam normal    Pulmonary  Pulmonary exam normal Breath sounds clear to auscultation    Other Findings  post-pubertal.      Anesthesia Plan  ASA Score- 2     Anesthesia Type- IV sedation with anesthesia with ASA Monitors.         Additional Monitors:     Airway Plan:            Plan Factors-Exercise tolerance (METS): >4 METS.    Chart reviewed. EKG reviewed. Imaging results reviewed. Existing labs reviewed. Patient summary reviewed.    Patient is not a current smoker.  Patient instructed to abstain from smoking on day of procedure. Patient did not smoke on day of surgery.    Obstructive sleep apnea risk education given perioperatively.        Induction- intravenous.    Postoperative Plan-     Perioperative Resuscitation Plan - Level 1 - Full Code.       Informed Consent- Anesthetic plan and risks discussed with patient.  I personally reviewed this patient with the CRNA. Discussed and agreed on the Anesthesia Plan with the CRNA..

## 2024-10-07 NOTE — ANESTHESIA POSTPROCEDURE EVALUATION
Post-Op Assessment Note    CV Status:  Stable  Pain Score: 0    Pain management: adequate       Mental Status:  Alert and awake   Hydration Status:  Euvolemic   PONV Controlled:  Controlled   Airway Patency:  Patent     Post Op Vitals Reviewed: Yes    No anethesia notable event occurred.    Staff: Anesthesiologist               /60 (10/07/24 1122)    Temp 97.6 °F (36.4 °C) (10/07/24 1122)    Pulse 76 (10/07/24 1122)   Resp 20 (10/07/24 1122)    SpO2 96 % (10/07/24 1122)

## 2024-10-14 ENCOUNTER — OFFICE VISIT (OUTPATIENT)
Age: 39
End: 2024-10-14
Payer: COMMERCIAL

## 2024-10-14 VITALS
OXYGEN SATURATION: 97 % | DIASTOLIC BLOOD PRESSURE: 62 MMHG | WEIGHT: 212 LBS | SYSTOLIC BLOOD PRESSURE: 99 MMHG | RESPIRATION RATE: 16 BRPM | HEIGHT: 66 IN | HEART RATE: 83 BPM | BODY MASS INDEX: 34.07 KG/M2

## 2024-10-14 DIAGNOSIS — F43.22 ADJUSTMENT DISORDER WITH ANXIETY: Primary | ICD-10-CM

## 2024-10-14 PROCEDURE — 99214 OFFICE O/P EST MOD 30 MIN: CPT | Performed by: INTERNAL MEDICINE

## 2024-10-14 NOTE — PROGRESS NOTES
Ambulatory Visit  Name: Astrid Kennedy      : 1985      MRN: [id number]  Encounter Provider: Noelle Ewing DO  Encounter Date: 10/14/2024   Encounter department: [facility] INTERNAL MEDICINE    Assessment & Plan  Adjustment disorder with anxiety  -she is struggling with expressing her frustrations leading to yelling frequently at her son and her family  -has supportive friends/coworkers and does not feel additional therapy is needed at this time  -labs reviewed and unremarkable  -she is agreeable to starting sertraline 50mg daily, side effects discussed with close follow up in one month  Orders:    sertraline (ZOLOFT) 50 mg tablet; Take 0.5 tablets (25 mg total) by mouth daily at bedtime for 7 days, THEN 1 tablet (50 mg total) daily at bedtime for 23 days.       History of Present Illness     HPI    Has stress in her life.  Her 13yr son has ADHD, anxiety and is neurodivergent.  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   She denies panic attacks or depression.    Her mother has bipolar and anxiety.  She has gone to counseling with her son.    PHQ-2/9 Depression Screening    Little interest or pleasure in doing things: 1 - several days  Feeling down, depressed, or hopeless: 1 - several days  PHQ-2 Score: 2  PHQ-2 Interpretation: Negative depression screen       BETH-7 Flowsheet Screening      Flowsheet Row Most Recent Value   Over the last two weeks, how often have you been bothered by the following problems?     Feeling nervous, anxious, or on edge 3   Not being able to stop or control worrying 0   Worrying too much about different things 0   Trouble relaxing  0   Being so restless that it's hard to sit still 0   Becoming easily annoyed or irritable  3   Feeling afraid as if something awful might happen 0   How difficult have these problems made it for you to do your work, take care of things at home, or get along with other people?  Somewhat difficult   BETH Score   "6            History obtained from : patient    Review of Systems   Gastrointestinal:  Negative for abdominal pain, diarrhea and nausea.   Neurological:  Negative for headaches.   Psychiatric/Behavioral:  Positive for agitation. Negative for decreased concentration, dysphoric mood and sleep disturbance. The patient is not nervous/anxious.      Medical History Reviewed by provider this encounter:       Current Outpatient Medications on File Prior to Visit   Medication Sig Dispense Refill    clindamycin (CLEOCIN T) 1 % lotion Apply topically every morning Apply to affected area      clobetasol (TEMOVATE) 0.05 % cream APPLY TWICE DAILY TO RASH FOR 4-5 WEEKS      EPINEPHrine (EPIPEN) 0.3 mg/0.3 mL SOAJ Inject 0.3 mg into a muscle once      ibuprofen (MOTRIN) 600 mg tablet Take 1 tablet (600 mg total) by mouth every 6 (six) hours as needed for mild pain 30 tablet 0    spironolactone (ALDACTONE) 50 mg tablet TAKE 2 TABLETS IN THE MORNING AND 1 TABLET IN THE EVENING      tretinoin (RETIN-A) 0.025 % cream APPLY SPARINGLY TO FACE NIGHTLY       No current facility-administered medications on file prior to visit.      Social History     Tobacco Use    Smoking status: Never    Smokeless tobacco: Never   Vaping Use    Vaping status: Never Used   Substance and Sexual Activity    Alcohol use: Yes     Comment: Social    Drug use: Never    Sexual activity: Yes     Partners: Male     Birth control/protection: Male Sterilization         Objective     BP 99/62   Pulse 83   Resp 16   Ht 5' 6\" (1.676 m)   Wt 96.2 kg (212 lb)   SpO2 97%   BMI 34.22 kg/m²     Physical Exam  Vitals reviewed.   Cardiovascular:      Rate and Rhythm: Normal rate and regular rhythm.      Pulses: Normal pulses.      Heart sounds: Normal heart sounds.   Pulmonary:      Effort: Pulmonary effort is normal. No respiratory distress.      Breath sounds: Normal breath sounds. No wheezing.   Musculoskeletal:      Right lower leg: No edema.      Left lower leg: No " edema.   Neurological:      Mental Status: She is alert and oriented to person, place, and time.         Recent Results (from the past 840 hour(s))   Type and screen    Collection Time: 09/11/24  7:20 AM   Result Value Ref Range    ABO Grouping A     Rh Factor Positive     Antibody Screen Negative     Specimen Expiration Date 20241009    Lipid panel    Collection Time: 09/11/24  7:21 AM   Result Value Ref Range    Cholesterol 194 See Comment mg/dL    Triglycerides 238 (H) See Comment mg/dL    HDL, Direct 46 (L) >=50 mg/dL    LDL Calculated 100 0 - 100 mg/dL    Non-HDL-Chol (CHOL-HDL) 148 mg/dl   TSH, 3rd generation with Free T4 reflex    Collection Time: 09/11/24  7:21 AM   Result Value Ref Range    TSH 3RD GENERATON 1.471 0.450 - 4.500 uIU/mL   Comprehensive metabolic panel    Collection Time: 09/11/24  7:21 AM   Result Value Ref Range    Sodium 136 135 - 147 mmol/L    Potassium 3.9 3.5 - 5.3 mmol/L    Chloride 103 96 - 108 mmol/L    CO2 28 21 - 32 mmol/L    ANION GAP 5 4 - 13 mmol/L    BUN 13 5 - 25 mg/dL    Creatinine 0.75 0.60 - 1.30 mg/dL    Glucose, Fasting 92 65 - 99 mg/dL    Calcium 8.9 8.4 - 10.2 mg/dL    AST 11 (L) 13 - 39 U/L    ALT 10 7 - 52 U/L    Alkaline Phosphatase 71 34 - 104 U/L    Total Protein 7.5 6.4 - 8.4 g/dL    Albumin 4.1 3.5 - 5.0 g/dL    Total Bilirubin 0.44 0.20 - 1.00 mg/dL    eGFR 102 ml/min/1.73sq m   CBC and Platelet    Collection Time: 09/11/24  7:21 AM   Result Value Ref Range    WBC 6.90 4.31 - 10.16 Thousand/uL    RBC 4.60 3.81 - 5.12 Million/uL    Hemoglobin 13.2 11.5 - 15.4 g/dL    Hematocrit 40.9 34.8 - 46.1 %    MCV 89 82 - 98 fL    MCH 28.7 26.8 - 34.3 pg    MCHC 32.3 31.4 - 37.4 g/dL    RDW 12.6 11.6 - 15.1 %    Platelets 307 149 - 390 Thousands/uL    MPV 9.0 8.9 - 12.7 fL   POCT pregnancy, urine    Collection Time: 10/07/24  9:50 AM   Result Value Ref Range    EXT Preg Test, Ur Negative Negative    Control Valid Valid

## 2024-10-15 PROCEDURE — 88307 TISSUE EXAM BY PATHOLOGIST: CPT | Performed by: PATHOLOGY

## 2024-10-15 PROCEDURE — 88305 TISSUE EXAM BY PATHOLOGIST: CPT | Performed by: PATHOLOGY

## 2024-10-15 PROCEDURE — 88344 IMHCHEM/IMCYTCHM EA MLT ANTB: CPT | Performed by: PATHOLOGY

## 2024-10-24 ENCOUNTER — OFFICE VISIT (OUTPATIENT)
Dept: OBGYN CLINIC | Facility: CLINIC | Age: 39
End: 2024-10-24

## 2024-10-24 VITALS — WEIGHT: 217.4 LBS | BODY MASS INDEX: 35.09 KG/M2 | SYSTOLIC BLOOD PRESSURE: 110 MMHG | DIASTOLIC BLOOD PRESSURE: 72 MMHG

## 2024-10-24 DIAGNOSIS — Z98.890 STATUS POST LEEP (LOOP ELECTROSURGICAL EXCISION PROCEDURE) OF CERVIX: Primary | ICD-10-CM

## 2024-10-24 PROCEDURE — 99024 POSTOP FOLLOW-UP VISIT: CPT | Performed by: STUDENT IN AN ORGANIZED HEALTH CARE EDUCATION/TRAINING PROGRAM

## 2024-10-24 NOTE — PROGRESS NOTES
Ambulatory Visit  Name: Astrid Kennedy      : 1985      MRN: [id number]  Encounter Provider: Naz Dickey DO  Encounter Date: 10/24/2024   Encounter department: [facility] [place]    Assessment & Plan  Status post LEEP (loop electrosurgical excision procedure) of cervix  -10/7/24 s/p LEEP procedure, pathology EUGENE 3, negative margins   -healing well on exam today, doing well post op   -follow up in 6 months for repeat pap            History of Present Illness     Astrid Kennedy is a 39 y.o. female  LMP 10/6/24 presents for 2 week follow up from LEEP procedure on 10/7/24. Pain well controlled, no vaginal bleeding, no abnormal vaginal discharge, voiding and having bowel movements.       Review of Systems   Constitutional:  Negative for appetite change, chills, fatigue and fever.   Respiratory:  Negative for cough, chest tightness, shortness of breath and wheezing.    Cardiovascular:  Negative for chest pain, palpitations and leg swelling.   Gastrointestinal:  Negative for abdominal distention, abdominal pain, constipation, diarrhea, nausea and vomiting.   Endocrine: Negative for cold intolerance, heat intolerance and polyuria.   Genitourinary:  Negative for difficulty urinating, dyspareunia, dysuria, genital sores, menstrual problem, vaginal bleeding, vaginal discharge and vaginal pain.   Neurological:  Negative for dizziness, weakness, light-headedness and headaches.           Objective     /72   Wt 98.6 kg (217 lb 6.4 oz)   LMP 10/06/2024 (Exact Date)   BMI 35.09 kg/m²     Physical Exam  Constitutional:       General: She is not in acute distress.     Appearance: Normal appearance. She is not ill-appearing.   Cardiovascular:      Rate and Rhythm: Normal rate.   Pulmonary:      Effort: Pulmonary effort is normal. No respiratory distress.   Abdominal:      General: Abdomen is flat. There is no distension.      Palpations: Abdomen is soft.       Tenderness: There is no abdominal tenderness. There is no guarding or rebound.   Genitourinary:     General: Normal vulva.      Exam position: Lithotomy position.      Labia:         Right: No rash, tenderness, lesion or injury.         Left: No rash, tenderness, lesion or injury.       Vagina: Normal. No foreign body. No vaginal discharge, erythema, tenderness, bleeding or lesions.      Cervix: Normal. No cervical motion tenderness, discharge, friability, lesion, erythema, cervical bleeding or eversion.      Uterus: Normal. Not enlarged, not fixed, not tender and no uterine prolapse.       Adnexa: Right adnexa normal and left adnexa normal.        Right: No mass, tenderness or fullness.          Left: No mass, tenderness or fullness.        Comments: LEEP biopsy healing well   Musculoskeletal:      Right lower leg: No edema.      Left lower leg: No edema.   Neurological:      General: No focal deficit present.      Mental Status: She is alert and oriented to person, place, and time.   Psychiatric:         Mood and Affect: Mood normal.         Behavior: Behavior normal.         Thought Content: Thought content normal.         Judgment: Judgment normal.

## 2024-10-24 NOTE — ASSESSMENT & PLAN NOTE
-10/7/24 s/p LEEP procedure, pathology EUGENE 3, negative margins   -healing well on exam today, doing well post op   -follow up in 6 months for repeat pap

## 2024-11-05 ENCOUNTER — RA CDI HCC (OUTPATIENT)
Dept: OTHER | Facility: HOSPITAL | Age: 39
End: 2024-11-05

## 2024-11-05 PROBLEM — Z00.00 ANNUAL PHYSICAL EXAM: Status: RESOLVED | Noted: 2018-07-20 | Resolved: 2024-11-05

## 2024-11-06 DIAGNOSIS — F43.22 ADJUSTMENT DISORDER WITH ANXIETY: ICD-10-CM

## 2024-11-14 ENCOUNTER — OFFICE VISIT (OUTPATIENT)
Age: 39
End: 2024-11-14
Payer: COMMERCIAL

## 2024-11-14 VITALS
HEART RATE: 86 BPM | SYSTOLIC BLOOD PRESSURE: 102 MMHG | OXYGEN SATURATION: 98 % | HEIGHT: 66 IN | BODY MASS INDEX: 34.39 KG/M2 | RESPIRATION RATE: 15 BRPM | DIASTOLIC BLOOD PRESSURE: 66 MMHG | WEIGHT: 214 LBS

## 2024-11-14 DIAGNOSIS — F43.22 ADJUSTMENT DISORDER WITH ANXIETY: Primary | ICD-10-CM

## 2024-11-14 DIAGNOSIS — E66.09 CLASS 1 OBESITY DUE TO EXCESS CALORIES WITHOUT SERIOUS COMORBIDITY WITH BODY MASS INDEX (BMI) OF 34.0 TO 34.9 IN ADULT: ICD-10-CM

## 2024-11-14 DIAGNOSIS — E66.811 CLASS 1 OBESITY DUE TO EXCESS CALORIES WITHOUT SERIOUS COMORBIDITY WITH BODY MASS INDEX (BMI) OF 34.0 TO 34.9 IN ADULT: ICD-10-CM

## 2024-11-14 PROCEDURE — 99214 OFFICE O/P EST MOD 30 MIN: CPT | Performed by: INTERNAL MEDICINE

## 2024-11-14 NOTE — PATIENT INSTRUCTIONS
"Patient Education     Weight Loss Diet   About this topic   There are many \"trendy\" weight loss diets that are popular today. Many of these diets can end up being more harmful than helpful. The healthiest way to lose weight is to burn more calories than you eat.  A weight loss diet should help you have a healthy view of eating. It is NOT healthy to stop eating to try and lose weight. A good diet plan will help you cut down your food intake and make healthy choices.  A healthy weight loss goal is 1 to 2 pounds (0.5 to 1 kg) per week. Reducing calories in your diet, burning calories through exercise, or both can help you lose weight. Combining a healthy diet with regular physical activity can help you get the best results.  To cut calories in your diet you can:  Switch from whole milk to 1% or skim milk.  Switch from regular cheese to low-fat or fat-free cheese.  Use healthier condiment choices:  Fat-free or low-fat sour cream or salad dressings  Spray butter  Diet syrups or jellies over regular  Try frozen yogurt as a dessert rather than eating ice cream.  Skip the chips. Snack on carrots, vegetables, or fruit. If chips are a favorite of yours, try the baked style and watch portion size.  Eat grilled, roasted, boiled, broiled, or baked meats. Avoid deep-frying. Choose skinless poultry, lean red meat, lean cuts of pork, and fish for good protein sources.  Try flavored no-calorie pavon. Do not drink soda and juices that have many calories.  Choose fruit instead of sweets.  General   Eating smaller meals more often may be helpful. This will keep you from overeating at your next meal. Also, eating meals slowly helps you feel full faster.  If eating 3 meals is a part of your lifestyle, choose more lean proteins and higher fiber foods to fill you up at each meal.  Do not skip meals. Most often if you skip a meal, you eat too much at the next meal.  Eat smaller portions. Use a smaller plate or bowl for meals, and when you " are eating out, eat half and take the rest home.  Plan ahead. Plan your meals and grocery list before going to the store. Planning will keep you from getting meals from restaurants.  Do not go to the grocery store hungry. You are more likely to buy snacks that are not good for you.  Portion out snacks. When you are having a snack, instead of grabbing the whole bag, portion a small amount out to give yourself a stopping point.  Drink water before and after your meals to help fill you up without the calories.  When eating starchy foods, choose whole-grain products. These have a lot of fiber which will make you feel full. Fiber also helps lower cholesterol and helps with bowel function.  If you need a helpful start, ask your doctor to send you to a dietitian for weight loss help.         What will the results be?   Losing excess weight will make your whole body healthier. You will have more energy for your daily activities and lower your risk for health problems.  What lifestyle changes are needed?   Stay active. Eating healthy is not always enough to lose weight. Burning calories by exercising is a big part of weight loss.  What foods are good to eat?   The key is to watch your portion sizes. It is best to choose foods that are lower in fat and calories.  Choose lean meats:  Boneless, skinless chicken breast  Pork loin  90% lean beef  Lean turkey meat  Fresh fish (not fried)  Choose low-fat dairy products:  1% or skim milk  Spray butter or margarine  Low-fat or fat-free cheese  Frozen yogurt or low-calorie ice cream  Choose fresh fruits, vegetables, beans and lentils, and whole wheat products more often.  Choose water to drink more often. Drink diet or no-calorie beverages when you want something other than water. Aim to get your calories from the foods you eat.  Choose smart snacks:  Fruits  Vegetables  Low-fat or nonfat yogurt  Low-fat or no-fat cheese, such as cottage cheese  Unsalted nuts  Hard-boiled  egg  Hummus  Guacamole  Natural peanut butter  Popcorn with no butter - use pepper, garlic, or another spice to taste  Whole grain crackers  What foods should be limited or avoided?   Limit high-fat, high-sodium, and high-calorie foods like:  Fried foods  Processed meats  Whole-fat dairy products  Candy, cookies, chips, pastries  Sausage, beckham, any full-fat meats  Soda, juice  Beer, wine, and mixed drinks (alcohol)  Will there be any other care needed?   What do I do first before trying to lose weight?  Talk to your doctor and dietitian to see if you need to lose weight. Work with them to set your weight loss goals.  If you have a chronic illness, such as high blood sugar or high blood pressure, ask a doctor or dietitian what diet and exercise is right for you.  Ask your doctor about how much you are able to exercise and what type of exercise is good for you.  Helpful tips   Keep a food journal to help keep you on track.  Join a support group.  Tips for burning calories:  If your workplace is near your house, choose to walk or bike to work instead of driving.  Take 20-minute walks each day. Walk around during your lunch break. You will not only burn calories, but will raise your energy for the rest of the day.  Take the stairs over the elevator.  Join a gym or exercise class with a friend.  Try to exercise 30 minutes a day for overall health. Three 10-minute sessions work too. Aim for 60 to 90 minutes a day to lose weight.  Drink lots of water before, during, and after exercise.  Last Reviewed Date   2021-06-24  Consumer Information Use and Disclaimer   This generalized information is a limited summary of diagnosis, treatment, and/or medication information. It is not meant to be comprehensive and should be used as a tool to help the user understand and/or assess potential diagnostic and treatment options. It does NOT include all information about conditions, treatments, medications, side effects, or risks that may  apply to a specific patient. It is not intended to be medical advice or a substitute for the medical advice, diagnosis, or treatment of a health care provider based on the health care provider's examination and assessment of a patient’s specific and unique circumstances. Patients must speak with a health care provider for complete information about their health, medical questions, and treatment options, including any risks or benefits regarding use of medications. This information does not endorse any treatments or medications as safe, effective, or approved for treating a specific patient. [employer] and its affiliates disclaim any warranty or liability relating to this information or the use thereof. The use of this information is governed by the Terms of Use, available at https://www.woltersSolaborateuwer.com/en/know/clinical-effectiveness-terms   Copyright   Copyright © 2024 [employer] and its affiliates and/or licensors. All rights reserved.

## 2024-11-14 NOTE — PROGRESS NOTES
Name: Astrid Kennedy      : 1985      MRN: [id number]  Encounter Provider: Noelle Ewing DO  Encounter Date: 2024   Encounter department: [facility] INTERNAL MEDICINE  :  Assessment & Plan  Adjustment disorder with anxiety  -much improved on sertraline 50mg daily, now asymptomatic  -will continue present dose with follow up in 6months  -pt to consider formal CBT  Orders:    sertraline (ZOLOFT) 50 mg tablet; Take 1 tablet (50 mg total) by mouth daily    Class 1 obesity due to excess calories without serious comorbidity with body mass index (BMI) of 34.0 to 34.9 in adult    -BMI 34 without comorbidity  -failed prior attempts with diet and exercise  -agreeable to Weight Loss Management referral.  Orders:    Ambulatory referral to Weight Management; Future           History of Present Illness   HPI  She was started on sertraline one month ago.  She still feel emotions but does not react.  Developed nausea initially but has subsided.  She is sleeping well, denies panic attacks.  Denies HA, dizziness.    BETH-7 Flowsheet Screening      Flowsheet Row Most Recent Value   Over the last two weeks, how often have you been bothered by the following problems?     Feeling nervous, anxious, or on edge 0   Not being able to stop or control worrying 0   Worrying too much about different things 1   Trouble relaxing  0   Being so restless that it's hard to sit still 0   Becoming easily annoyed or irritable  1   Feeling afraid as if something awful might happen 0   How difficult have these problems made it for you to do your work, take care of things at home, or get along with other people?  Somewhat difficult   BETH Score  2          She is concerned about her weight.  She had lost weight with weight watchers but unfortunately regained it due to her lifestyle.    Review of Systems   Constitutional:  Positive for appetite change. Negative for activity change.        Gradual weight gain   Respiratory:   "Positive for shortness of breath (DE JESUS). Negative for wheezing.    Cardiovascular:  Negative for chest pain.   Neurological:  Negative for dizziness and headaches.   Psychiatric/Behavioral:  Negative for sleep disturbance. The patient is nervous/anxious.      Medical History Reviewed by provider this encounter:     .  Current Outpatient Medications on File Prior to Visit   Medication Sig Dispense Refill    clindamycin (CLEOCIN T) 1 % lotion Apply topically every morning Apply to affected area      clobetasol (TEMOVATE) 0.05 % cream APPLY TWICE DAILY TO RASH FOR 4-5 WEEKS      EPINEPHrine (EPIPEN) 0.3 mg/0.3 mL SOAJ Inject 0.3 mg into a muscle once      sertraline (ZOLOFT) 50 mg tablet Take 1 tablet (50 mg total) by mouth daily 30 tablet 0    spironolactone (ALDACTONE) 50 mg tablet TAKE 2 TABLETS IN THE MORNING AND 1 TABLET IN THE EVENING      tretinoin (RETIN-A) 0.025 % cream APPLY SPARINGLY TO FACE NIGHTLY      ibuprofen (MOTRIN) 600 mg tablet Take 1 tablet (600 mg total) by mouth every 6 (six) hours as needed for mild pain 30 tablet 0     No current facility-administered medications on file prior to visit.      Social History     Tobacco Use    Smoking status: Never    Smokeless tobacco: Never   Vaping Use    Vaping status: Never Used   Substance and Sexual Activity    Alcohol use: Yes     Comment: Social    Drug use: Never    Sexual activity: Yes     Partners: Male     Birth control/protection: Male Sterilization        Objective   /66 (BP Location: Left arm, Patient Position: Sitting, Cuff Size: Large)   Pulse 86   Resp 15   Ht 5' 6\" (1.676 m)   Wt 97.1 kg (214 lb)   LMP 10/06/2024 (Exact Date)   SpO2 98%   BMI 34.54 kg/m²      Physical Exam  Vitals reviewed.   Constitutional:       Appearance: She is obese.   Neurological:      Mental Status: She is alert and oriented to person, place, and time.   Psychiatric:         Mood and Affect: Mood normal.         "

## 2024-12-30 ENCOUNTER — OFFICE VISIT (OUTPATIENT)
Age: 39
End: 2024-12-30

## 2024-12-30 ENCOUNTER — TELEPHONE (OUTPATIENT)
Age: 39
End: 2024-12-30

## 2024-12-30 VITALS
BODY MASS INDEX: 35.71 KG/M2 | DIASTOLIC BLOOD PRESSURE: 60 MMHG | WEIGHT: 222.2 LBS | TEMPERATURE: 97.1 F | SYSTOLIC BLOOD PRESSURE: 100 MMHG | HEART RATE: 80 BPM | RESPIRATION RATE: 16 BRPM | HEIGHT: 66 IN

## 2024-12-30 DIAGNOSIS — E78.5 HYPERLIPIDEMIA: Primary | ICD-10-CM

## 2024-12-30 DIAGNOSIS — E66.812 OBESITY, CLASS II, BMI 35-39.9: ICD-10-CM

## 2024-12-30 DIAGNOSIS — E66.811 OBESITY, CLASS I, BMI 30-34.9: ICD-10-CM

## 2024-12-30 DIAGNOSIS — E66.811 CLASS 1 OBESITY DUE TO EXCESS CALORIES WITHOUT SERIOUS COMORBIDITY WITH BODY MASS INDEX (BMI) OF 34.0 TO 34.9 IN ADULT: ICD-10-CM

## 2024-12-30 DIAGNOSIS — E66.09 CLASS 1 OBESITY DUE TO EXCESS CALORIES WITHOUT SERIOUS COMORBIDITY WITH BODY MASS INDEX (BMI) OF 34.0 TO 34.9 IN ADULT: ICD-10-CM

## 2024-12-30 RX ORDER — TIRZEPATIDE 2.5 MG/.5ML
2.5 INJECTION, SOLUTION SUBCUTANEOUS WEEKLY
Qty: 2 ML | Refills: 0 | Status: SHIPPED | OUTPATIENT
Start: 2024-12-30 | End: 2025-01-27

## 2024-12-30 NOTE — PATIENT INSTRUCTIONS
I have sent Zebound to your pharmacy. The prior authorization process will been done through our prior authorization team and can take up to 3-4 weeks to process through the insurance.     - Start Zepbound 2.5 mg subcutaneously weekly. After you have taken the second pen, please give me an update, as we will likely increase the dose the next month if you are tolerating it well.    - Side effects of Zepbound include nausea, vomiting, diarrhea, or constipation. Keep an eye on your heart rate while on Zepbound. If you resting heart rate is greater than 100 beats per minutes, please notify me. If you develop severe abdominal pain, stop Zepbound and go to the emergency room, as that could be a sign of pancreatitis.     - Please notify me if you have surgery, upper endoscopy, or colonoscopy scheduled, as we typically hold Zepbound for one week prior to the procedure.     - Zepbound can reduce the effectiveness of oral hormonal birth control (birth control pills). Recommend a barrier backup method such as condoms to prevent pregnancy.       GLP-1 Managing Side Effects Tips:  - focus on small frequent meals  - moderate sugar and fat intake  - change the injection site (abdomen --> thigh--> back of arm)  - eat protein, crackers, mints and cathy-based drinks  - nighttime injections  - drink plenty of water  - consider fiber supplements like miralax    Tips for Nausea:  - Don't drink and eat simultaneously: separate fluids 30-60 minutes before and after meals when experiencing nausea or if you notice you become full quickly  - Choose mild smelling foods- strong smells can exacerbate nausea  - Cathy and peppermint- consider drinking cathy or peppermint tea, which can help alleviate symptoms  - Eat- don't skip meals, if you have nausea, it is understandable that you may not feel like eating. However, skipping meals is generally not recommended as this can lead to lower blood sugar and fatigue. Furthermore, it is essential to  consume adequate protein during weight loss. You can opt for a protein shake, a meal replacement supplement, bone broth or soup.     Tips for Constipation:   - Drink plenty of water  - Eat food with a high fiber content: increase your fiber intake by consuming these types of foods:   Eat more whole grain products like barley, oats, farro, brown or wild rice and quinoa.    Look for choices with 100% whole wheat, rye, oats or bran as the first ingredient listed    Check nutrition fact labels and choose products with 4gm of dietary fiber or more per serving   Add more beans to your diet   Eat more fresh fruits and vegetables with skins on them    Exercise- increase physical activity as it helps stimulate gastric mobility, which moves stool through the digestive tract     Nutrition RX:  - start tracking intake  - focus on protein- goal is 30 grams per meal; 90 grams per day  - focus on increasing fiber first by increasing vegetable servings per day  - do not skip breakfast and goal water intake 64 oz daily    Physical Activity RX:  - Goal is 150-200 mins of activity weekly.  Try to include at least 2 strength training sessions; increasing lean body mass will really help you to lose weight and maintain weight loss.

## 2024-12-30 NOTE — TELEPHONE ENCOUNTER
"Follow up from wm visit. The  provider has recommended possible change to patients current medication, Zoloft. They are recommending maybe changing to wellbutrin as it is more \"weight negative\", Please advise and follow up with patient after consulting with provider thank you   "

## 2024-12-30 NOTE — PROGRESS NOTES
Assessment/Plan:  Astrid was seen today for consult.    Diagnoses and all orders for this visit:    Hyperlipidemia  -     tirzepatide (Zepbound) 2.5 mg/0.5 mL auto-injector; Inject 0.5 mL (2.5 mg total) under the skin once a week for 28 days    Obesity, Class I, BMI 30-34.9    Class 1 obesity due to excess calories without serious comorbidity with body mass index (BMI) of 34.0 to 34.9 in adult  -     Ambulatory referral to Weight Management    Obesity, Class II, BMI 35-39.9  -     tirzepatide (Zepbound) 2.5 mg/0.5 mL auto-injector; Inject 0.5 mL (2.5 mg total) under the skin once a week for 28 days       No problem-specific Assessment & Plan notes found for this encounter.     - Discussed options of HealthyCORE-Intensive Lifestyle Intervention Program, Very Low Calorie Diet-VLCD, and Conservative Program and the role of weight loss medications.  - Explained the importance of making lifestyle changes first before starting anti-obesity medications.  - Patient should demonstrate lifestyle changes first before anti-obesity medication initiated.   - Patient is interested in pursuing Conservative Program  - Initial weight loss goal of 5-10% weight loss for improved health as studies have shown this is where we see the greatest impact on improving health and decreasing risk of obesity related conditions.  - Weight loss can improve patient's co-morbid conditions and/or prevent weight-related complications.  - Stop Bang 2/8  - Labs reviewed: As below.      General Recommendations:  Nutrition:  Eat breakfast daily.  Do not skip meals.     Food log (ie.) www.Qoopl.com, sparkpeople.com, loseit.com, calorieking.com, etc.    Practice mindful eating.  Be sure to set aside time to eat, eat slowly, and savor your food.    Hydration:    At least 64oz of water daily.  No sugar sweetened beverages.  No juice (eat the fruit instead).    Exercise:  Studies have shown that the ideal exercise goal is somewhere between 150 to 300  minutes of moderate intensity exercise a week.  Start with exercising 10 minutes every other day and gradually increase physical activity with a goal of at least 150 minutes of moderate intensity exercise a week, divided over at least 3 days a week.  An example of this would be exercising 30 minutes a day, 5 days a week.  Resistance training can increase muscle mass and increase our resting metabolic rate.   FULL BODY resistance training is recommended 2-3 times a week.  Do not do this on consecutive days to allow for muscle recovery.    Aim for a bare minimum 5000 steps, even on days you do not exercise.    Monitoring:   Weigh yourself daily.  If this causes undue stress, then just weigh yourself once a week.  Weigh yourself the same time of the day with the same amount of clothing on.  Preferably this should be done after waking up, before you eat, and with no clothing or minimal clothing on.    Specific Goals:  Gradually increase physical activity to a goal of 5 days per week for 30 minutes of MODERATE intensity PLUS 2 days per week of FULL BODY resistance training  Goal protein intake of 60-80 grams per day  5-10 servings of fruits and vegetables per day    Calorie goal:  1695-7216 (Provided with meal plan to follow).    Return visit:  2 months  - RX- Zepbound 2.5mg weekly x 4 weeks and then increase to 5mg weekly. Discussed AOM options at length with patient, no contraindications to treatment. If zepbound is not approved will try Contrave. Given family history of obesity and T2DM goal is do decrease weight by 10-15% TBW and continue working on nutrition/physical activity.   - Patient to discuss zoloft therapy with provider to see if she could potentially switch to wellbutrin  -Nutrition RX:  - start tracking intake  - focus on protein- goal is 30 grams per meal; 90 grams per day  - focus on increasing fiber first by increasing vegetable servings per day  - do not skip breakfast and goal water intake 64 oz  daily    Physical Activity RX:  - Goal is 150-200 mins of activity weekly.  Try to include at least 2 strength training sessions; increasing lean body mass will really help you to lose weight and maintain weight loss.      I have sent Zebound to your pharmacy. The prior authorization process will been done through our prior authorization team and can take up to 3-4 weeks to process through the insurance.     - Start Zepbound 2.5 mg subcutaneously weekly. After you have taken the second pen, please give me an update, as we will likely increase the dose the next month if you are tolerating it well.    - Side effects of Zepbound include nausea, vomiting, diarrhea, or constipation. Keep an eye on your heart rate while on Zepbound. If you resting heart rate is greater than 100 beats per minutes, please notify me. If you develop severe abdominal pain, stop Zepbound and go to the emergency room, as that could be a sign of pancreatitis.     - Please notify me if you have surgery, upper endoscopy, or colonoscopy scheduled, as we typically hold Zepbound for one week prior to the procedure.     - Zepbound can reduce the effectiveness of oral hormonal birth control (birth control pills). Recommend a barrier backup method such as condoms to prevent pregnancy.       Total time spent reviewing chart, interviewing patient, examining patient, discussing plan, answering all questions, and documentin min.       ______________________________________________________________________        Subjective:   Chief Complaint   Patient presents with    Consult     MWM- Consult; GW-150 range; Waist-39.5in; Stop Bang-       HPI: Astrid Kennedy  is a 39 y.o. female with history of hyperlipidemia, and excess weight, here to pursue weight loss management.  Previous notes and records have been reviewed.    Patient reports she had severe food allergies in college and reports weighing about 120 lbs. After her first son was born her dairy and  soy allergies went away and she gained weight but was able to maintain 150-170 lbs.  She then had her daughter and started to struggle with her weight at that time. She did weight watchers and lost 35 lbs in 4 months but then had trouble with holidays and summer. She feels tired a lot of the time and has lost her motivation.     Nut and seafood allergy so has hard time finding protein bars etc.     Patient just started on Zoloft about 2 months ago and has noticed weight gain with this as well.     Family History:  Both parents struggle with weight   Mom- T2DM, high cholesterol   Dad- T2DM, high blood pressure     HPI  Wt Readings from Last 20 Encounters:   12/30/24 101 kg (222 lb 3.2 oz)   11/14/24 97.1 kg (214 lb)   10/24/24 98.6 kg (217 lb 6.4 oz)   10/14/24 96.2 kg (212 lb)   10/07/24 93.9 kg (207 lb)   08/06/24 95 kg (209 lb 6.4 oz)   08/06/24 94.2 kg (207 lb 9.6 oz)   07/17/24 93.9 kg (207 lb)   05/02/24 89.8 kg (198 lb)   02/28/24 88.5 kg (195 lb)   01/09/24 88.2 kg (194 lb 6.4 oz)   01/02/24 90.1 kg (198 lb 9.6 oz)   10/20/21 94.3 kg (208 lb)   07/29/21 91.6 kg (202 lb)   10/12/20 91.2 kg (201 lb)   10/09/20 90.9 kg (200 lb 6.4 oz)   10/01/20 92.1 kg (203 lb 0.7 oz)   10/01/20 92.1 kg (203 lb)   07/23/20 93 kg (205 lb)   05/01/19 89.4 kg (197 lb)     Excess Weight:  Severity: severe  Onset:  childhood, post pregnancy   Highest weight: 222 lbs  Lowest Weight: feels most comfortable at 175 lbs goal is 150 lbs  Current weight: 222 lbs  What has been tried: Diet and Exercise and Commercial Weight Loss Programs-ie. Weight Watchers, Alexandra Mas, Nutrisystem, etc.  Contributing factors: Poor Food Choices, Stress/Emotional Eating, Pregnancy, and Medications  Associated symptoms and effects: comorbid conditions, fatigue, body image issues, and decreased self esteem    Food logging:  B: body craves a bread substance in the morning-- regan killer bagel, fruit (strawberries, grapes, oranges)  S:  L: salad for lunch,  protein, vegetable, starch (**struggles with condiments ketchup, bbq sauce)   S: kid snacks if they are around   D: meat, potato, veggie. 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  Denies any hx of glaucoma, seizures, kidney stones, gallstones.  Denies Hx of CAD, PAD, palpitations, arrhythmia.   Denies uncontrolled anxiety or depression, suicidal behavior or thinking , insomnia or sleep disturbance.   Past Surgical History:   Procedure Laterality Date    ND CONIZATION CERVIX W/WO D&C RPR ELTRD EXC N/A 10/7/2024    Procedure: LOOP ELECTRODE EXCISIONAL PROCEDURE, EUA;  Surgeon: Naz Dickey DO;  Location: [facility] MAIN OR;  Service: Gynecology    TONSILECTOMY AND ADNOIDECTOMY      WISDOM TOOTH EXTRACTION       The following portions of the patient's history were reviewed and updated as appropriate: allergies, current medications, past family history, past medical history, past social history, past surgical history, and problem list.    Review Of Systems:  Review of Systems   Constitutional:  Positive for fatigue.   HENT: Negative.     Eyes: Negative.    Gastrointestinal:   "Negative for constipation, diarrhea and nausea.   Genitourinary: Negative.    Musculoskeletal: Negative.    Skin: Negative.    Allergic/Immunologic: Negative.    Neurological: Negative.  Negative for headaches.   Hematological: Negative.    Psychiatric/Behavioral: Negative.         Objective:  /60 (BP Location: Left arm, Patient Position: Sitting)   Pulse 80   Temp (!) 97.1 °F (36.2 °C) (Tympanic)   Resp 16   Ht 5' 5.75\" (1.67 m)   Wt 101 kg (222 lb 3.2 oz)   BMI 36.14 kg/m²   Physical Exam  Vitals reviewed.   Constitutional:       Appearance: She is obese.   HENT:      Head: Normocephalic.      Mouth/Throat:      Mouth: Mucous membranes are moist.   Eyes:      Pupils: Pupils are equal, round, and reactive to light.   Cardiovascular:      Rate and Rhythm: Normal rate and regular rhythm.   Pulmonary:      Effort: Pulmonary effort is normal.      Breath sounds: Normal breath sounds.   Abdominal:      General: Bowel sounds are normal.      Palpations: Abdomen is soft.   Musculoskeletal:         General: Normal range of motion.      Cervical back: Normal range of motion.   Skin:     General: Skin is warm and dry.   Neurological:      General: No focal deficit present.      Mental Status: She is alert.   Psychiatric:         Mood and Affect: Mood normal.         Thought Content: Thought content normal.         Judgment: Judgment normal.         Labs and Imaging  Recent labs and imaging have been personally reviewed.  Lab Results   Component Value Date    WBC 6.90 09/11/2024    HGB 13.2 09/11/2024    HCT 40.9 09/11/2024    MCV 89 09/11/2024     09/11/2024     Lab Results   Component Value Date    SODIUM 136 09/11/2024    K 3.9 09/11/2024     09/11/2024    CO2 28 09/11/2024    AGAP 5 09/11/2024    BUN 13 09/11/2024    CREATININE 0.75 09/11/2024    GLUC 95 10/01/2020    GLUF 92 09/11/2024    CALCIUM 8.9 09/11/2024    AST 11 (L) 09/11/2024    ALT 10 09/11/2024    ALKPHOS 71 09/11/2024    TP 7.5 " "09/11/2024    TBILI 0.44 09/11/2024    EGFR 102 09/11/2024     No results found for: \"HGBA1C\"  Lab Results   Component Value Date    TOS0EPHOAOOL 1.471 09/11/2024     Lab Results   Component Value Date    CHOLESTEROL 194 09/11/2024     Lab Results   Component Value Date    HDL 46 (L) 09/11/2024     Lab Results   Component Value Date    TRIG 238 (H) 09/11/2024     Lab Results   Component Value Date    LDLCALC 100 09/11/2024     "

## 2024-12-31 ENCOUNTER — TELEPHONE (OUTPATIENT)
Age: 39
End: 2024-12-31

## 2024-12-31 NOTE — TELEPHONE ENCOUNTER
PA for Zepbound SUBMITTED to [employer]    via    [x]CMM-KEY: [id number]  []Surescripts-Case ID #    []Availity-Auth ID #  NDC #    []Faxed to plan   []Other website    []Phone call Case ID #      []PA sent as URGENT    All office notes, labs and other pertaining documents and studies sent. Clinical questions answered. Awaiting determination from insurance company.     Turnaround time for your insurance to make a decision on your Prior Authorization can take 7-21 business days.

## 2025-01-03 ENCOUNTER — PATIENT MESSAGE (OUTPATIENT)
Age: 40
End: 2025-01-03

## 2025-01-06 ENCOUNTER — TELEPHONE (OUTPATIENT)
Age: 40
End: 2025-01-06

## 2025-01-06 NOTE — TELEPHONE ENCOUNTER
Left message on pt Vm,letting her know that she needs and appt to  discuss treatment changes. Thanks.

## 2025-01-06 NOTE — TELEPHONE ENCOUNTER
Patient returning call. Patient stating that she will be on vacation Saturday til Wednesday  and wanting to know if she should start her injections before or after vacation due to if she has any side effects. Patient informed it okay to start injection when she comes back from her vacation.

## 2025-01-06 NOTE — TELEPHONE ENCOUNTER
Patient calling back. Warm transfer to office. Shahana to assist patient with her medication question.

## 2025-01-07 NOTE — TELEPHONE ENCOUNTER
Please schedule her for an appt earlier than the scheduled 1/23/25 appt to discuss possible switch in therapy.  Thanks.

## 2025-01-09 ENCOUNTER — OFFICE VISIT (OUTPATIENT)
Age: 40
End: 2025-01-09
Payer: COMMERCIAL

## 2025-01-09 VITALS
HEIGHT: 65 IN | BODY MASS INDEX: 36.99 KG/M2 | OXYGEN SATURATION: 97 % | TEMPERATURE: 97.6 F | SYSTOLIC BLOOD PRESSURE: 118 MMHG | HEART RATE: 73 BPM | DIASTOLIC BLOOD PRESSURE: 70 MMHG | WEIGHT: 222 LBS | RESPIRATION RATE: 16 BRPM

## 2025-01-09 DIAGNOSIS — E66.09 CLASS 2 OBESITY DUE TO EXCESS CALORIES WITHOUT SERIOUS COMORBIDITY WITH BODY MASS INDEX (BMI) OF 36.0 TO 36.9 IN ADULT: ICD-10-CM

## 2025-01-09 DIAGNOSIS — E66.812 CLASS 2 OBESITY DUE TO EXCESS CALORIES WITHOUT SERIOUS COMORBIDITY WITH BODY MASS INDEX (BMI) OF 36.0 TO 36.9 IN ADULT: ICD-10-CM

## 2025-01-09 DIAGNOSIS — F43.22 ADJUSTMENT DISORDER WITH ANXIETY: Primary | ICD-10-CM

## 2025-01-09 PROCEDURE — 99213 OFFICE O/P EST LOW 20 MIN: CPT | Performed by: INTERNAL MEDICINE

## 2025-01-09 NOTE — PROGRESS NOTES
Name: Astrid Kennedy      : 1985      MRN: [id number]  Encounter Provider: Noelle Ewing DO  Encounter Date: 2025   Encounter department: [facility] INTERNAL MEDICINE  :  Assessment & Plan  Adjustment disorder with anxiety  -overall improved since starting sertraline 50mg daily  -she is concerned about weight gain with SSRI and has been recommended use of wellbutrin instead by Weight Management  -discussed wellbutrin is not drug of choice for anxiety but may be tried.  It may also be added to sertraline for augmentation and offset side effects of SSRI.  -at this time, she will start tirzepatide for weight loss, if results is not what is expected, then she will decide about discontinuing sertraline       Class 2 obesity due to excess calories without serious comorbidity with body mass index (BMI) of 36.0 to 36.9 in adult    -BMI 36 without comorbidity  -pt concern with concurrent use of SSRI contributing to her weight gain.  At this time, she will continue medication but start tirzepatide  -agree with dietary changes and exercise  -follow up with Weight loss Management              History of Present Illness     HPI    She plans to start zepbound next week.  She is currently on day 4 of monitoring her diet closely.    She was started on sertraline three months ago and has continued to gain weight.  She is not reacting to situations when she would normally react to.  She has been advised with weight management with possible switch to drug.     Review of Systems   Constitutional:  Positive for activity change and appetite change.   Psychiatric/Behavioral:  Negative for dysphoric mood. The patient is nervous/anxious.          Current Outpatient Medications:   •  EPINEPHrine (EPIPEN) 0.3 mg/0.3 mL SOAJ, Inject 0.3 mg into a muscle once, Disp: , Rfl:   •  sertraline (ZOLOFT) 50 mg tablet, Take 1 tablet (50 mg total) by mouth daily, Disp: 90 tablet, Rfl: 1  •  clindamycin (CLEOCIN T) 1 %  "lotion, Apply topically every morning Apply to affected area, Disp: , Rfl:   •  clobetasol (TEMOVATE) 0.05 % cream, APPLY TWICE DAILY TO RASH FOR 4-5 WEEKS, Disp: , Rfl:   •  ibuprofen (MOTRIN) 600 mg tablet, Take 1 tablet (600 mg total) by mouth every 6 (six) hours as needed for mild pain, Disp: 30 tablet, Rfl: 0  •  spironolactone (ALDACTONE) 50 mg tablet, TAKE 2 TABLETS IN THE MORNING AND 1 TABLET IN THE EVENING (Patient not taking: Reported on 12/30/2024), Disp: , Rfl:   •  tirzepatide (Zepbound) 2.5 mg/0.5 mL auto-injector, Inject 0.5 mL (2.5 mg total) under the skin once a week for 28 days, Disp: 2 mL, Rfl: 0  •  tretinoin (RETIN-A) 0.025 % cream, APPLY SPARINGLY TO FACE NIGHTLY, Disp: , Rfl:     Objective   /70 (BP Location: Left arm, Patient Position: Sitting, Cuff Size: Standard)   Pulse 73   Temp 97.6 °F (36.4 °C) (Tympanic Core)   Resp 16   Ht 5' 5\" (1.651 m)   Wt 101 kg (222 lb)   SpO2 97%   BMI 36.94 kg/m²      Physical Exam  Vitals reviewed.   Constitutional:       Appearance: She is obese.   Neurological:      Mental Status: She is alert and oriented to person, place, and time.   Psychiatric:         Mood and Affect: Mood normal.         "

## 2025-01-24 ENCOUNTER — TELEPHONE (OUTPATIENT)
Age: 40
End: 2025-01-24

## 2025-01-24 DIAGNOSIS — E66.811 OBESITY, CLASS I, BMI 30-34.9: ICD-10-CM

## 2025-01-24 DIAGNOSIS — E78.5 HYPERLIPIDEMIA: Primary | ICD-10-CM

## 2025-01-24 RX ORDER — TIRZEPATIDE 5 MG/.5ML
5 INJECTION, SOLUTION SUBCUTANEOUS WEEKLY
Qty: 2 ML | Refills: 2 | Status: SHIPPED | OUTPATIENT
Start: 2025-01-24 | End: 2025-04-18

## 2025-01-24 NOTE — TELEPHONE ENCOUNTER
How are you tolerating the medication?   [] Nausea  [] Vomiting  [] Diarrhea  [x] Asymptomatic  [] Other:    Last visit weight: 222    Current weight: 214    Date of last injection: 1/22/2025    How many injections do you have left: 2    Patient uses [facility] on [address], [place].     Patient was told to update Alma Rosa after the second dose.         
no

## 2025-03-11 ENCOUNTER — OFFICE VISIT (OUTPATIENT)
Age: 40
End: 2025-03-11
Payer: COMMERCIAL

## 2025-03-11 VITALS
BODY MASS INDEX: 33.36 KG/M2 | DIASTOLIC BLOOD PRESSURE: 60 MMHG | HEIGHT: 65 IN | RESPIRATION RATE: 16 BRPM | SYSTOLIC BLOOD PRESSURE: 100 MMHG | HEART RATE: 75 BPM | TEMPERATURE: 98 F | WEIGHT: 200.2 LBS

## 2025-03-11 DIAGNOSIS — E66.812 OBESITY, CLASS II, BMI 35-39.9: ICD-10-CM

## 2025-03-11 DIAGNOSIS — E78.5 HYPERLIPIDEMIA: Primary | ICD-10-CM

## 2025-03-11 DIAGNOSIS — E66.811 OBESITY, CLASS I, BMI 30-34.9: ICD-10-CM

## 2025-03-11 PROCEDURE — 99214 OFFICE O/P EST MOD 30 MIN: CPT | Performed by: PHYSICIAN ASSISTANT

## 2025-03-11 RX ORDER — TIRZEPATIDE 5 MG/.5ML
5 INJECTION, SOLUTION SUBCUTANEOUS WEEKLY
Qty: 2 ML | Refills: 2 | Status: SHIPPED | OUTPATIENT
Start: 2025-03-11 | End: 2025-06-03

## 2025-03-11 RX ORDER — SODIUM FLUORIDE1.1%, POTASSIUM NITRATE 5% 5.8; 57.5 MG/ML; MG/ML
GEL, DENTIFRICE DENTAL
COMMUNITY
Start: 2025-02-17

## 2025-03-11 NOTE — PROGRESS NOTES
Assessment/Plan:  Diagnoses and all orders for this visit:    Hyperlipidemia    Obesity, Class II, BMI 35-39.9         No problem-specific Assessment & Plan notes found for this encounter.     - Discussed options of HealthyCORE-Intensive Lifestyle Intervention Program, Very Low Calorie Diet-VLCD, and Conservative Program and the role of weight loss medications.  - Explained the importance of making lifestyle changes first before starting anti-obesity medications.  - Patient should demonstrate lifestyle changes first before anti-obesity medication initiated.   - Patient is interested in pursuing Conservative Program  - Initial weight loss goal of 5-10% weight loss for improved health as studies have shown this is where we see the greatest impact on improving health and decreasing risk of obesity related conditions.  - Weight loss can improve patient's co-morbid conditions and/or prevent weight-related complications.  - Stop Bang 2/8  - Labs reviewed: As below.      General Recommendations:  Nutrition:  Eat breakfast daily.  Do not skip meals.     Food log (ie.) www.NuPathe.com, sparkpeople.com, loseit.com, calorieking.com, etc.    Practice mindful eating.  Be sure to set aside time to eat, eat slowly, and savor your food.    Hydration:    At least 64oz of water daily.  No sugar sweetened beverages.  No juice (eat the fruit instead).    Exercise:  Studies have shown that the ideal exercise goal is somewhere between 150 to 300 minutes of moderate intensity exercise a week.  Start with exercising 10 minutes every other day and gradually increase physical activity with a goal of at least 150 minutes of moderate intensity exercise a week, divided over at least 3 days a week.  An example of this would be exercising 30 minutes a day, 5 days a week.  Resistance training can increase muscle mass and increase our resting metabolic rate.   FULL BODY resistance training is recommended 2-3 times a week.  Do not do this on  consecutive days to allow for muscle recovery.    Aim for a bare minimum 5000 steps, even on days you do not exercise.    Monitoring:   Weigh yourself daily.  If this causes undue stress, then just weigh yourself once a week.  Weigh yourself the same time of the day with the same amount of clothing on.  Preferably this should be done after waking up, before you eat, and with no clothing or minimal clothing on.    Specific Goals:  Gradually increase physical activity to a goal of 5 days per week for 30 minutes of MODERATE intensity PLUS 2 days per week of FULL BODY resistance training  Goal protein intake of 60-80 grams per day  5-10 servings of fruits and vegetables per day    Calorie goal:  0318-5617 (Provided with meal plan to follow).    Return visit:  2 months  1) Continue Zepbound 5mg once weekly  2) Encouraged physical activity at today's visit- goal of 150 mins per week and including 2 strength training sessions 30 mins each   3) Nutrition RX:  - start tracking intake  - focus on protein- goal is 30 grams per meal; 90 grams per day  - focus on increasing fiber first by increasing vegetable servings per day  - do not skip breakfast and goal water intake 64 oz daily    Physical Activity RX:  - Goal is 150-200 mins of activity weekly.  Try to include at least 2 strength training sessions; increasing lean body mass will really help you to lose weight and maintain weight loss.        - Side effects of Zepbound include nausea, vomiting, diarrhea, or constipation. Keep an eye on your heart rate while on Zepbound. If you resting heart rate is greater than 100 beats per minutes, please notify me. If you develop severe abdominal pain, stop Zepbound and go to the emergency room, as that could be a sign of pancreatitis.     - Please notify me if you have surgery, upper endoscopy, or colonoscopy scheduled, as we typically hold Zepbound for one week prior to the procedure.     - Zepbound can reduce the effectiveness of  oral hormonal birth control (birth control pills). Recommend a barrier backup method such as condoms to prevent pregnancy.       Total time spent reviewing chart, interviewing patient, examining patient, discussing plan, answering all questions, and documentin  min.       ______________________________________________________________________        Subjective:   No chief complaint on file.      Initial start on Zepbound weight: 222 lbs (2025)  Current weight: 200 lbs     HPI: Astrid Kennedy  is a 39 y.o. female with history of hyperlipidemia, and excess weight, here to pursue weight loss management.  Previous notes and records have been reviewed.    Patient presents for follow up. Notices more acne and heavier menstrual cycle recently; upon further questioning she did stop her aldactone in the interim as she was starting on Zepbound.    Patient was using weight watchers minnie to track since starting Zepbound- was getting around 80 grams of protein.   2 nights per week works late; would have a protein shake later in the evening.      Diet Recall:  B- / bagel, fruit and occasionally a protein shake  L- protein veggie  D- protein and veggie    Patient had some mild side effects when she started on the Zepbound 5mg-- mild nausea and some redness at injection site but these have both since resolved.     Physical Activity- has not started yet but is going to start now with walking       Weight History:  Patient reports she had severe food allergies in college and reports weighing about 120 lbs. After her first son was born her dairy and soy allergies went away and she gained weight but was able to maintain 150-170 lbs.  She then had her daughter and started to struggle with her weight at that time. She did weight watchers and lost 35 lbs in 4 months but then had trouble with holidays and summer. She feels tired a lot of the time and has lost her motivation.     Nut and seafood allergy so has hard time finding  protein bars etc.     Patient just started on Zoloft about 2 months ago and has noticed weight gain with this as well.     Family History:  Both parents struggle with weight   Mom- T2DM, high cholesterol   Dad- T2DM, high blood pressure     HPI  Wt Readings from Last 20 Encounters:   01/09/25 101 kg (222 lb)   12/30/24 101 kg (222 lb 3.2 oz)   11/14/24 97.1 kg (214 lb)   10/24/24 98.6 kg (217 lb 6.4 oz)   10/14/24 96.2 kg (212 lb)   10/07/24 93.9 kg (207 lb)   08/06/24 95 kg (209 lb 6.4 oz)   08/06/24 94.2 kg (207 lb 9.6 oz)   07/17/24 93.9 kg (207 lb)   05/02/24 89.8 kg (198 lb)   02/28/24 88.5 kg (195 lb)   01/09/24 88.2 kg (194 lb 6.4 oz)   01/02/24 90.1 kg (198 lb 9.6 oz)   10/20/21 94.3 kg (208 lb)   07/29/21 91.6 kg (202 lb)   10/12/20 91.2 kg (201 lb)   10/09/20 90.9 kg (200 lb 6.4 oz)   10/01/20 92.1 kg (203 lb 0.7 oz)   10/01/20 92.1 kg (203 lb)   07/23/20 93 kg (205 lb)     Excess Weight:  Severity: severe  Onset:  childhood, post pregnancy   Highest weight: 222 lbs  Lowest Weight: feels most comfortable at 175 lbs goal is 150 lbs  Current weight: 222 lbs  What has been tried: Diet and Exercise and Commercial Weight Loss Programs-ie. Weight Watchers, ChangePandaig, Nutrisystem, etc.  Contributing factors: Poor Food Choices, Stress/Emotional Eating, Pregnancy, and Medications  Associated symptoms and effects: comorbid conditions, fatigue, body image issues, and decreased self esteem    Food logging:  B: body craves a bread substance in the morning-- regan killer bagel, fruit (strawberries, grapes, oranges)  S:  L: salad for lunch, protein, vegetable, starch (**struggles with condiments ketchup, bbq sauce)   S: kid snacks if they are around   D: meat, potato, veggie. 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  Denies any hx of glaucoma, seizures, kidney stones, gallstones.  Denies Hx of CAD, PAD, palpitations, arrhythmia.   Denies uncontrolled anxiety or depression, suicidal behavior or thinking , insomnia or sleep disturbance.   Past Surgical History:   Procedure Laterality Date    TX CONIZATION CERVIX W/WO D&C RPR ELTRD EXC N/A 10/7/2024    Procedure: LOOP ELECTRODE EXCISIONAL PROCEDURE, EUA;  Surgeon: Naz Dickey DO;  Location: [facility] MAIN OR;  Service: Gynecology    TONSILECTOMY AND ADNOIDECTOMY      WISDOM TOOTH EXTRACTION       The following portions of the patient's history were reviewed and updated as appropriate: allergies, current medications, past family history, past medical history, past social history, past surgical history, and problem list.    Review Of Systems:  Review of Systems   Constitutional:  Positive for fatigue.   HENT: Negative.     Eyes: Negative.    Gastrointestinal:  Negative for constipation, diarrhea and nausea.   Genitourinary: Negative.    Musculoskeletal: Negative.    Skin: Negative.    Allergic/Immunologic: Negative.    Neurological: Negative.  Negative for headaches.   Hematological: Negative.    Psychiatric/Behavioral: Negative.         Objective:  There were no vitals taken for this visit.  Physical Exam  Vitals reviewed.  "  Constitutional:       Appearance: She is obese.   HENT:      Head: Normocephalic.      Mouth/Throat:      Mouth: Mucous membranes are moist.   Eyes:      Pupils: Pupils are equal, round, and reactive to light.   Cardiovascular:      Rate and Rhythm: Normal rate and regular rhythm.   Pulmonary:      Effort: Pulmonary effort is normal.      Breath sounds: Normal breath sounds.   Abdominal:      General: Bowel sounds are normal.      Palpations: Abdomen is soft.   Musculoskeletal:         General: Normal range of motion.      Cervical back: Normal range of motion.   Skin:     General: Skin is warm and dry.   Neurological:      General: No focal deficit present.      Mental Status: She is alert.   Psychiatric:         Mood and Affect: Mood normal.         Thought Content: Thought content normal.         Judgment: Judgment normal.       Labs and Imaging  Recent labs and imaging have been personally reviewed.  Lab Results   Component Value Date    WBC 6.90 09/11/2024    HGB 13.2 09/11/2024    HCT 40.9 09/11/2024    MCV 89 09/11/2024     09/11/2024     Lab Results   Component Value Date    SODIUM 136 09/11/2024    K 3.9 09/11/2024     09/11/2024    CO2 28 09/11/2024    AGAP 5 09/11/2024    BUN 13 09/11/2024    CREATININE 0.75 09/11/2024    GLUC 95 10/01/2020    GLUF 92 09/11/2024    CALCIUM 8.9 09/11/2024    AST 11 (L) 09/11/2024    ALT 10 09/11/2024    ALKPHOS 71 09/11/2024    TP 7.5 09/11/2024    TBILI 0.44 09/11/2024    EGFR 102 09/11/2024     No results found for: \"HGBA1C\"  Lab Results   Component Value Date    MYT8FXFHOFZQ 1.471 09/11/2024     Lab Results   Component Value Date    CHOLESTEROL 194 09/11/2024     Lab Results   Component Value Date    HDL 46 (L) 09/11/2024     Lab Results   Component Value Date    TRIG 238 (H) 09/11/2024     Lab Results   Component Value Date    LDLCALC 100 09/11/2024     "

## 2025-04-08 DIAGNOSIS — E66.811 OBESITY, CLASS I, BMI 30-34.9: ICD-10-CM

## 2025-04-08 DIAGNOSIS — E78.5 HYPERLIPIDEMIA: Primary | ICD-10-CM

## 2025-04-08 RX ORDER — TIRZEPATIDE 7.5 MG/.5ML
7.5 INJECTION, SOLUTION SUBCUTANEOUS WEEKLY
Qty: 2 ML | Refills: 1 | Status: SHIPPED | OUTPATIENT
Start: 2025-04-08 | End: 2025-06-03

## 2025-05-06 ENCOUNTER — ANNUAL EXAM (OUTPATIENT)
Dept: OBGYN CLINIC | Facility: CLINIC | Age: 40
End: 2025-05-06
Payer: COMMERCIAL

## 2025-05-06 VITALS
HEIGHT: 66 IN | SYSTOLIC BLOOD PRESSURE: 100 MMHG | WEIGHT: 185.8 LBS | BODY MASS INDEX: 29.86 KG/M2 | DIASTOLIC BLOOD PRESSURE: 62 MMHG

## 2025-05-06 DIAGNOSIS — Z01.419 ENCOUNTER FOR GYNECOLOGICAL EXAMINATION (GENERAL) (ROUTINE) WITHOUT ABNORMAL FINDINGS: Primary | ICD-10-CM

## 2025-05-06 DIAGNOSIS — D06.9 HIGH GRADE SQUAMOUS INTRAEPITHELIAL LESION (HGSIL), GRADE 3 CIN, ON BIOPSY OF CERVIX: ICD-10-CM

## 2025-05-06 PROCEDURE — G0145 SCR C/V CYTO,THINLAYER,RESCR: HCPCS | Performed by: STUDENT IN AN ORGANIZED HEALTH CARE EDUCATION/TRAINING PROGRAM

## 2025-05-06 PROCEDURE — S0612 ANNUAL GYNECOLOGICAL EXAMINA: HCPCS | Performed by: STUDENT IN AN ORGANIZED HEALTH CARE EDUCATION/TRAINING PROGRAM

## 2025-05-06 PROCEDURE — G0476 HPV COMBO ASSAY CA SCREEN: HCPCS | Performed by: STUDENT IN AN ORGANIZED HEALTH CARE EDUCATION/TRAINING PROGRAM

## 2025-05-06 NOTE — PROGRESS NOTES
Name: Astrid Kennedy      : 1985      MRN: [id number]  Encounter Provider: Naz Dickey DO  Encounter Date: 2025   Encounter department: [facility] [place]  :  Assessment & Plan  Encounter for gynecological examination (general) (routine) without abnormal findings  -LMP: 25  -regular monthly menstrual cycles, denies dysmenorrhea or menorrhagia   -sexually active, denies dyspareunia,  with vasectomy for contraception  -STD testing: denies  -smoking: denies   -drinks alcohol socially  -recommend 30 min of exercise daily   -denies family history of ovarian, colon cancer  -paternal grandmother with possible breast cancer, pt will confirm. Considering helix testing vs official consult to genetic testing  -return in one year or earlier if needed          High grade squamous intraepithelial lesion (HGSIL), grade 3 EUGENE, on biopsy of cervix  -24 pap NILM HPV 18 pos   -24: 5 oclock cervical biopsy EUGENE III   -10/7/24 LEEP with EUGENE III and negative margins   -6 month post operative pap collected today             History of Present Illness   HPI  Astrid Kennedy is a 39 y.o. female  LMP 25 presents for annual exam.       Review of Systems   Constitutional:  Negative for appetite change, chills, fatigue and fever.   Respiratory:  Negative for cough, chest tightness, shortness of breath and wheezing.    Cardiovascular:  Negative for chest pain, palpitations and leg swelling.   Gastrointestinal:  Negative for abdominal distention, abdominal pain, constipation, diarrhea, nausea and vomiting.   Endocrine: Negative for cold intolerance, heat intolerance and polyuria.   Genitourinary:  Negative for difficulty urinating, dyspareunia, dysuria, genital sores, menstrual problem, vaginal bleeding, vaginal discharge and vaginal pain.   Neurological:  Negative for dizziness, weakness, light-headedness and headaches.          Objective   /62   Ht 5'  "6\" (1.676 m)   Wt 84.3 kg (185 lb 12.8 oz)   LMP 04/11/2025   BMI 29.99 kg/m²      Physical Exam  Constitutional:       General: She is not in acute distress.     Appearance: Normal appearance. She is not ill-appearing.   Cardiovascular:      Rate and Rhythm: Normal rate.   Pulmonary:      Effort: Pulmonary effort is normal. No respiratory distress.   Chest:   Breasts:     Breasts are symmetrical.      Right: Normal. No swelling, bleeding, inverted nipple, mass, nipple discharge, skin change or tenderness.      Left: Normal. No swelling, bleeding, inverted nipple, mass, nipple discharge, skin change or tenderness.   Abdominal:      General: Abdomen is flat. There is no distension.      Palpations: Abdomen is soft.      Tenderness: There is no abdominal tenderness. There is no guarding or rebound.   Genitourinary:     General: Normal vulva.      Exam position: Lithotomy position.      Labia:         Right: No rash, tenderness, lesion or injury.         Left: No rash, tenderness, lesion or injury.       Urethra: No prolapse, urethral pain, urethral swelling or urethral lesion.      Vagina: Normal. No foreign body. No vaginal discharge, erythema, tenderness, bleeding or lesions.      Cervix: Normal. No cervical motion tenderness, discharge, friability, lesion, erythema, cervical bleeding or eversion.      Uterus: Normal. Not enlarged, not fixed, not tender and no uterine prolapse.       Adnexa: Right adnexa normal and left adnexa normal.        Right: No mass, tenderness or fullness.          Left: No mass, tenderness or fullness.     Musculoskeletal:      Right lower leg: No edema.      Left lower leg: No edema.   Lymphadenopathy:      Upper Body:      Right upper body: No supraclavicular, axillary or pectoral adenopathy.      Left upper body: No supraclavicular, axillary or pectoral adenopathy.   Neurological:      General: No focal deficit present.      Mental Status: She is alert and oriented to person, place, " and time.   Psychiatric:         Mood and Affect: Mood normal.         Behavior: Behavior normal.         Thought Content: Thought content normal.         Judgment: Judgment normal.

## 2025-05-06 NOTE — ASSESSMENT & PLAN NOTE
-5/2/24 pap NILM HPV 18 pos   -7/17/24: 5 oclock cervical biopsy EUGENE III   -10/7/24 LEEP with EUGENE III and negative margins   -6 month post operative pap collected today

## 2025-05-06 NOTE — ASSESSMENT & PLAN NOTE
-LMP: 4/11/25  -regular monthly menstrual cycles, denies dysmenorrhea or menorrhagia   -sexually active, denies dyspareunia,  with vasectomy for contraception  -STD testing: denies  -smoking: denies   -drinks alcohol socially  -recommend 30 min of exercise daily   -denies family history of ovarian, colon cancer  -paternal grandmother with possible breast cancer, pt will confirm. Considering helix testing vs official consult to genetic testing  -return in one year or earlier if needed

## 2025-05-09 ENCOUNTER — RESULTS FOLLOW-UP (OUTPATIENT)
Dept: OBGYN CLINIC | Facility: CLINIC | Age: 40
End: 2025-05-09

## 2025-05-09 LAB
LAB AP GYN PRIMARY INTERPRETATION: NORMAL
Lab: NORMAL

## 2025-05-13 DIAGNOSIS — E66.812 OBESITY, CLASS II, BMI 35-39.9: ICD-10-CM

## 2025-05-13 DIAGNOSIS — E78.5 HYPERLIPIDEMIA: Primary | ICD-10-CM

## 2025-05-13 RX ORDER — TIRZEPATIDE 10 MG/.5ML
10 INJECTION, SOLUTION SUBCUTANEOUS WEEKLY
Qty: 2 ML | Refills: 2 | Status: SHIPPED | OUTPATIENT
Start: 2025-05-13 | End: 2025-08-05

## 2025-06-05 PROBLEM — Z01.419 ENCOUNTER FOR GYNECOLOGICAL EXAMINATION (GENERAL) (ROUTINE) WITHOUT ABNORMAL FINDINGS: Status: RESOLVED | Noted: 2025-05-06 | Resolved: 2025-06-05

## 2025-06-23 ENCOUNTER — OFFICE VISIT (OUTPATIENT)
Age: 40
End: 2025-06-23
Payer: COMMERCIAL

## 2025-06-23 VITALS
RESPIRATION RATE: 16 BRPM | HEIGHT: 66 IN | DIASTOLIC BLOOD PRESSURE: 60 MMHG | WEIGHT: 172.4 LBS | SYSTOLIC BLOOD PRESSURE: 104 MMHG | TEMPERATURE: 98.2 F | HEART RATE: 88 BPM | BODY MASS INDEX: 27.71 KG/M2

## 2025-06-23 DIAGNOSIS — E78.5 HYPERLIPIDEMIA, UNSPECIFIED HYPERLIPIDEMIA TYPE: ICD-10-CM

## 2025-06-23 DIAGNOSIS — E78.5 HYPERLIPIDEMIA: ICD-10-CM

## 2025-06-23 DIAGNOSIS — E66.811 OBESITY, CLASS I, BMI 30-34.9: Primary | ICD-10-CM

## 2025-06-23 DIAGNOSIS — E66.812 OBESITY, CLASS II, BMI 35-39.9: ICD-10-CM

## 2025-06-23 PROCEDURE — 99214 OFFICE O/P EST MOD 30 MIN: CPT | Performed by: PHYSICIAN ASSISTANT

## 2025-06-23 RX ORDER — TIRZEPATIDE 10 MG/.5ML
10 INJECTION, SOLUTION SUBCUTANEOUS WEEKLY
Qty: 2 ML | Refills: 2 | Status: SHIPPED | OUTPATIENT
Start: 2025-06-23 | End: 2025-09-15

## 2025-06-23 NOTE — PROGRESS NOTES
Assessment/Plan:  Astrid was seen today for follow-up.    Diagnoses and all orders for this visit:    Obesity, Class I, BMI 30-34.9    Hyperlipidemia, unspecified hyperlipidemia type           No problem-specific Assessment & Plan notes found for this encounter.     - Discussed options of HealthyCORE-Intensive Lifestyle Intervention Program, Very Low Calorie Diet-VLCD, and Conservative Program and the role of weight loss medications.  - Explained the importance of making lifestyle changes first before starting anti-obesity medications.  - Patient should demonstrate lifestyle changes first before anti-obesity medication initiated.   - Patient is interested in pursuing Conservative Program  - Initial weight loss goal of 5-10% weight loss for improved health as studies have shown this is where we see the greatest impact on improving health and decreasing risk of obesity related conditions.  - Weight loss can improve patient's co-morbid conditions and/or prevent weight-related complications.  - Stop Bang 2/8  - Labs reviewed: As below.      General Recommendations:  Nutrition:  Eat breakfast daily.  Do not skip meals.     Food log (ie.) www.[email or web address].com, sparkpeople.com, loseit.com, calorieking.com, etc.    Practice mindful eating.  Be sure to set aside time to eat, eat slowly, and savor your food.    Hydration:    At least 64oz of water daily.  No sugar sweetened beverages.  No juice (eat the fruit instead).    Exercise:  Studies have shown that the ideal exercise goal is somewhere between 150 to 300 minutes of moderate intensity exercise a week.  Start with exercising 10 minutes every other day and gradually increase physical activity with a goal of at least 150 minutes of moderate intensity exercise a week, divided over at least 3 days a week.  An example of this would be exercising 30 minutes a day, 5 days a week.  Resistance training can increase muscle mass and increase our resting metabolic rate.   FULL BODY  resistance training is recommended 2-3 times a week.  Do not do this on consecutive days to allow for muscle recovery.    Aim for a bare minimum 5000 steps, even on days you do not exercise.    Monitoring:   Weigh yourself daily.  If this causes undue stress, then just weigh yourself once a week.  Weigh yourself the same time of the day with the same amount of clothing on.  Preferably this should be done after waking up, before you eat, and with no clothing or minimal clothing on.    Specific Goals:  Gradually increase physical activity to a goal of 5 days per week for 30 minutes of MODERATE intensity PLUS 2 days per week of FULL BODY resistance training  Goal protein intake of 60-80 grams per day  5-10 servings of fruits and vegetables per day    Calorie goal:  [id number] (Provided with meal plan to follow).    Return visit:  4 months  1) Continue Zepbound 10mg once weekly; discussed how to start maintenance protocol with going down to 5mg and then increase to every 10 day injection and then every 14 days.   2) Encouraged physical activity at today's visit- goal of 150 mins per week and including 2 strength training sessions 30 mins each   3) Nutrition RX:  - start tracking intake  - focus on protein- goal is 30 grams per meal; 90 grams per day  - focus on increasing fiber first by increasing vegetable servings per day  - do not skip breakfast and goal water intake 64 oz daily    Physical Activity RX:  - Goal is 150-200 mins of activity weekly.  Try to include at least 2 strength training sessions; increasing lean body mass will really help you to lose weight and maintain weight loss.        - Side effects of Zepbound include nausea, vomiting, diarrhea, or constipation. Keep an eye on your heart rate while on Zepbound. If you resting heart rate is greater than 100 beats per minutes, please notify me. If you develop severe abdominal pain, stop Zepbound and go to the emergency room, as that could be a sign of  pancreatitis.     - Please notify me if you have surgery, upper endoscopy, or colonoscopy scheduled, as we typically hold Zepbound for one week prior to the procedure.     - Zepbound can reduce the effectiveness of oral hormonal birth control (birth control pills). Recommend a barrier backup method such as condoms to prevent pregnancy.       Total time spent reviewing chart, interviewing patient, examining patient, discussing plan, answering all questions, and documentin  min.       ______________________________________________________________________        Subjective:   Chief Complaint   Patient presents with   • Follow-up     MWM-  3 mo f/u; Waist- 33.5in       Initial start on Zepbound weight: 222 lbs (2025)  Last visit weight: 200 lbs   Current weight: 172 lbs   Initial waist circumference:     HPI: Astrid BEAU Kennedy  is a 39 y.o. female with history of hyperlipidemia, and excess weight, here to pursue weight loss management.  Previous notes and records have been reviewed.    Patient presents for follow up. She is on 10mg Zepbound, no reported side effects. She has been on 10mg x 1 month. She has lost 50 lbs so far.         Physical Activity:  120 minutes per week   Walking, biking and swimming   Planning on adding in more strength training     Diet Recall:  B- yogurt + fruit   L- protein veggie; salad and chicken   D- protein and veggie; smaller portion of what the family eats         Weight History:  Patient reports she had severe food allergies in college and reports weighing about 120 lbs. After her first son was born her dairy and soy allergies went away and she gained weight but was able to maintain 150-170 lbs.  She then had her daughter and started to struggle with her weight at that time. She did weight watchers and lost 35 lbs in 4 months but then had trouble with holidays and summer. She feels tired a lot of the time and has lost her motivation.     Nut and seafood allergy so has hard time  finding protein bars etc.     Patient just started on Zoloft about 2 months ago and has noticed weight gain with this as well.     Family History:  Both parents struggle with weight   Mom- T2DM, high cholesterol   Dad- T2DM, high blood pressure     HPI  Wt Readings from Last 20 Encounters:   06/23/25 78.2 kg (172 lb 6.4 oz)   05/06/25 84.3 kg (185 lb 12.8 oz)   03/11/25 90.8 kg (200 lb 3.2 oz)   01/09/25 101 kg (222 lb)   12/30/24 101 kg (222 lb 3.2 oz)   11/14/24 97.1 kg (214 lb)   10/24/24 98.6 kg (217 lb 6.4 oz)   10/14/24 96.2 kg (212 lb)   10/07/24 93.9 kg (207 lb)   08/06/24 95 kg (209 lb 6.4 oz)   08/06/24 94.2 kg (207 lb 9.6 oz)   07/17/24 93.9 kg (207 lb)   05/02/24 89.8 kg (198 lb)   02/28/24 88.5 kg (195 lb)   01/09/24 88.2 kg (194 lb 6.4 oz)   01/02/24 90.1 kg (198 lb 9.6 oz)   10/20/21 94.3 kg (208 lb)   07/29/21 91.6 kg (202 lb)   10/12/20 91.2 kg (201 lb)   10/09/20 90.9 kg (200 lb 6.4 oz)     Excess Weight:  Severity: severe  Onset:  childhood, post pregnancy   Highest weight: 222 lbs  Lowest Weight: feels most comfortable at 175 lbs goal is 150 lbs  Current weight: 222 lbs  What has been tried: Diet and Exercise and Commercial Weight Loss Programs-ie. Weight Watchers, Blosonig, Nutrisystem, etc.  Contributing factors: Poor Food Choices, Stress/Emotional Eating, Pregnancy, and Medications  Associated symptoms and effects: comorbid conditions, fatigue, body image issues, and decreased self esteem    Food logging:  B: body craves a bread substance in the morning-- regan killer bagel, fruit (strawberries, grapes, oranges)  S:  L: salad for lunch, protein, vegetable, starch (**struggles with condiments ketchup, bbq sauce)   S: kid snacks if they are around   D: meat, potato, veggie. 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  Denies any hx of glaucoma, seizures, kidney stones, gallstones.  Denies Hx of CAD, PAD, palpitations, arrhythmia.   Denies uncontrolled anxiety or depression, suicidal behavior or thinking , insomnia or sleep disturbance.   Past Surgical History:   Procedure Laterality Date   • WA CONIZATION CERVIX W/WO D&C RPR ELTRD EXC N/A 10/7/2024    Procedure: LOOP ELECTRODE EXCISIONAL PROCEDURE, EUA;  Surgeon: Naz Dickey DO;  [facility]: [facility] MAIN OR;  Service: Gynecology   • TONSILECTOMY AND ADNOIDECTOMY     • WISDOM TOOTH EXTRACTION       The following portions of the patient's history were reviewed and updated as appropriate: allergies, current medications, past family history, past medical history, past social history, past surgical history, and problem list.    Review Of Systems:  Review of Systems   Constitutional:  Positive for fatigue.   HENT: Negative.     Eyes: Negative.    Gastrointestinal:  Negative for constipation, diarrhea and nausea.   Genitourinary: Negative.    Musculoskeletal: Negative.    Skin: Negative.    Allergic/Immunologic: Negative.    Neurological: Negative.  Negative for headaches.   Hematological: Negative.    Psychiatric/Behavioral: Negative.         Objective:  /60 (BP Location: Right arm, Patient Position:  "Sitting)   Pulse 88   Temp 98.2 °F (36.8 °C) (Tympanic)   Resp 16   Ht 5' 6\" (1.676 m)   Wt 78.2 kg (172 lb 6.4 oz)   BMI 27.83 kg/m²   Physical Exam  Vitals reviewed.   Constitutional:       Appearance: She is obese.   HENT:      Head: Normocephalic.      Mouth/Throat:      Mouth: Mucous membranes are moist.     Eyes:      Pupils: Pupils are equal, round, and reactive to light.       Cardiovascular:      Rate and Rhythm: Normal rate and regular rhythm.   Pulmonary:      Effort: Pulmonary effort is normal.      Breath sounds: Normal breath sounds.   Abdominal:      General: Bowel sounds are normal.      Palpations: Abdomen is soft.     Musculoskeletal:         General: Normal range of motion.      Cervical back: Normal range of motion.     Skin:     General: Skin is warm and dry.     Neurological:      General: No focal deficit present.      Mental Status: She is alert.     Psychiatric:         Mood and Affect: Mood normal.         Thought Content: Thought content normal.         Judgment: Judgment normal.       Labs and Imaging  Recent labs and imaging have been personally reviewed.  Lab Results   Component Value Date    WBC 6.90 09/11/2024    HGB 13.2 09/11/2024    HCT 40.9 09/11/2024    MCV 89 09/11/2024     09/11/2024     Lab Results   Component Value Date    SODIUM 136 09/11/2024    K 3.9 09/11/2024     09/11/2024    CO2 28 09/11/2024    AGAP 5 09/11/2024    BUN 13 09/11/2024    CREATININE 0.75 09/11/2024    GLUC 95 10/01/2020    GLUF 92 09/11/2024    CALCIUM 8.9 09/11/2024    AST 11 (L) 09/11/2024    ALT 10 09/11/2024    ALKPHOS 71 09/11/2024    TP 7.5 09/11/2024    TBILI 0.44 09/11/2024    EGFR 102 09/11/2024     No results found for: \"HGBA1C\"  Lab Results   Component Value Date    ERS4LLELYYEC 1.471 09/11/2024     Lab Results   Component Value Date    CHOLESTEROL 194 09/11/2024     Lab Results   Component Value Date    HDL 46 (L) 09/11/2024     Lab Results   Component Value Date    TRIG " 238 (H) 09/11/2024     Lab Results   Component Value Date    LDLCALC 100 09/11/2024

## (undated) DEVICE — SYRINGE 10ML LL

## (undated) DEVICE — GLOVE INDICATOR PI UNDERGLOVE SZ 6.5 BLUE

## (undated) DEVICE — ELECTRODE BALL E-Z CLEAN 2IN -0015

## (undated) DEVICE — STERILE 8 INCH PROCTO SWAB: Brand: CARDINAL HEALTH

## (undated) DEVICE — PREMIUM DRY TRAY LF: Brand: MEDLINE INDUSTRIES, INC.

## (undated) DEVICE — PVC URETHRAL CATHETER: Brand: DOVER

## (undated) DEVICE — PENCIL ELECTROSURG E-Z CLEAN -0035H

## (undated) DEVICE — CHLORHEXIDINE 4PCT 4 OZ

## (undated) DEVICE — BETHLEHEM UNIVERSAL MINOR VAG: Brand: CARDINAL HEALTH

## (undated) DEVICE — MEDI-VAC TUBING CONNECTOR 6-IN-1 STRAIGHT: Brand: CARDINAL HEALTH

## (undated) DEVICE — NEEDLE 25GA X 1 IN SAFETY GLIDE

## (undated) DEVICE — GLOVE PI ULTRA TOUCH SZ 6

## (undated) DEVICE — SPONGE LAP 18 X 18 IN